# Patient Record
Sex: MALE | Race: BLACK OR AFRICAN AMERICAN | NOT HISPANIC OR LATINO | Employment: FULL TIME | URBAN - METROPOLITAN AREA
[De-identification: names, ages, dates, MRNs, and addresses within clinical notes are randomized per-mention and may not be internally consistent; named-entity substitution may affect disease eponyms.]

---

## 2021-01-05 ENCOUNTER — HISTORICAL (OUTPATIENT)
Dept: INTERNAL MEDICINE | Facility: CLINIC | Age: 46
End: 2021-01-05

## 2021-01-05 LAB
ALBUMIN SERPL-MCNC: 3.8 GM/DL (ref 3.5–5)
ALBUMIN/GLOB SERPL: 0.8 RATIO (ref 1.1–2)
ALP SERPL-CCNC: 84 UNIT/L (ref 40–150)
ALT SERPL-CCNC: 25 UNIT/L (ref 0–55)
AST SERPL-CCNC: 22 UNIT/L (ref 5–34)
BILIRUB SERPL-MCNC: 0.6 MG/DL
BILIRUBIN DIRECT+TOT PNL SERPL-MCNC: 0.3 MG/DL (ref 0–0.5)
BILIRUBIN DIRECT+TOT PNL SERPL-MCNC: 0.3 MG/DL (ref 0–0.8)
BUN SERPL-MCNC: 13 MG/DL (ref 8.9–20.6)
CALCIUM SERPL-MCNC: 9 MG/DL (ref 8.4–10.2)
CHLORIDE SERPL-SCNC: 100 MMOL/L (ref 98–107)
CHOLEST SERPL-MCNC: 186 MG/DL
CHOLEST/HDLC SERPL: 4 {RATIO} (ref 0–5)
CO2 SERPL-SCNC: 30 MMOL/L (ref 22–29)
CREAT SERPL-MCNC: 0.81 MG/DL (ref 0.73–1.18)
CREAT UR-MCNC: 126 MG/DL (ref 58–161)
EST. AVERAGE GLUCOSE BLD GHB EST-MCNC: 185.8 MG/DL
GLOBULIN SER-MCNC: 4.5 GM/DL (ref 2.4–3.5)
GLUCOSE SERPL-MCNC: 92 MG/DL (ref 74–100)
HAV IGM SERPL QL IA: NONREACTIVE
HBA1C MFR BLD: 8.1 %
HBV CORE IGM SERPL QL IA: NONREACTIVE
HBV SURFACE AG SERPL QL IA: NONREACTIVE
HCV AB SERPL QL IA: NONREACTIVE
HDLC SERPL-MCNC: 49 MG/DL (ref 35–60)
HIV 1+2 AB+HIV1 P24 AG SERPL QL IA: NONREACTIVE
LDLC SERPL CALC-MCNC: 122 MG/DL (ref 50–140)
MICROALBUMIN UR-MCNC: 6.7 UG/ML
MICROALBUMIN/CREAT RATIO PNL UR: 5.3 MG/GM CR (ref 0–30)
POTASSIUM SERPL-SCNC: 4.1 MMOL/L (ref 3.5–5.1)
PROT SERPL-MCNC: 8.3 GM/DL (ref 6.4–8.3)
SODIUM SERPL-SCNC: 138 MMOL/L (ref 136–145)
T PALLIDUM AB SER QL: NONREACTIVE
TRIGL SERPL-MCNC: 73 MG/DL (ref 34–140)
TSH SERPL-ACNC: 1.22 UIU/ML (ref 0.35–4.94)
VLDLC SERPL CALC-MCNC: 15 MG/DL

## 2021-01-18 ENCOUNTER — HISTORICAL (OUTPATIENT)
Dept: RADIOLOGY | Facility: HOSPITAL | Age: 46
End: 2021-01-18

## 2021-08-10 ENCOUNTER — HISTORICAL (OUTPATIENT)
Dept: INTERNAL MEDICINE | Facility: CLINIC | Age: 46
End: 2021-08-10

## 2021-08-10 LAB
ALBUMIN SERPL-MCNC: 3.5 GM/DL (ref 3.5–5)
ALBUMIN/GLOB SERPL: 0.8 RATIO (ref 1.1–2)
ALP SERPL-CCNC: 88 UNIT/L (ref 40–150)
ALT SERPL-CCNC: 36 UNIT/L (ref 0–55)
AST SERPL-CCNC: 27 UNIT/L (ref 5–34)
BILIRUB SERPL-MCNC: 0.7 MG/DL
BILIRUBIN DIRECT+TOT PNL SERPL-MCNC: 0.3 MG/DL (ref 0–0.5)
BILIRUBIN DIRECT+TOT PNL SERPL-MCNC: 0.4 MG/DL (ref 0–0.8)
BUN SERPL-MCNC: 11.4 MG/DL (ref 8.9–20.6)
CALCIUM SERPL-MCNC: 9.5 MG/DL (ref 8.4–10.2)
CHLORIDE SERPL-SCNC: 100 MMOL/L (ref 98–107)
CHOLEST SERPL-MCNC: 182 MG/DL
CHOLEST/HDLC SERPL: 5 {RATIO} (ref 0–5)
CO2 SERPL-SCNC: 31 MMOL/L (ref 22–29)
CREAT SERPL-MCNC: 0.85 MG/DL (ref 0.73–1.18)
CREAT UR-MCNC: 169.3 MG/DL (ref 58–161)
EST. AVERAGE GLUCOSE BLD GHB EST-MCNC: 214.5 MG/DL
GLOBULIN SER-MCNC: 4.2 GM/DL (ref 2.4–3.5)
GLUCOSE SERPL-MCNC: 119 MG/DL (ref 74–100)
HBA1C MFR BLD: 9.1 %
HDLC SERPL-MCNC: 39 MG/DL (ref 35–60)
LDLC SERPL CALC-MCNC: 121 MG/DL (ref 50–140)
MICROALBUMIN UR-MCNC: 12.5 MG/L
MICROALBUMIN/CREAT RATIO PNL UR: 7.4 MG/GM CR (ref 0–30)
POTASSIUM SERPL-SCNC: 4.2 MMOL/L (ref 3.5–5.1)
PROT SERPL-MCNC: 7.7 GM/DL (ref 6.4–8.3)
SODIUM SERPL-SCNC: 137 MMOL/L (ref 136–145)
TRIGL SERPL-MCNC: 112 MG/DL (ref 34–140)
VLDLC SERPL CALC-MCNC: 22 MG/DL

## 2021-11-23 ENCOUNTER — HISTORICAL (OUTPATIENT)
Dept: INTERNAL MEDICINE | Facility: CLINIC | Age: 46
End: 2021-11-23

## 2021-11-23 LAB
ALBUMIN SERPL-MCNC: 3.6 GM/DL (ref 3.5–5)
ALBUMIN/GLOB SERPL: 0.9 RATIO (ref 1.1–2)
ALP SERPL-CCNC: 92 UNIT/L (ref 40–150)
ALT SERPL-CCNC: 35 UNIT/L (ref 0–55)
APPEARANCE, UA: CLEAR
AST SERPL-CCNC: 29 UNIT/L (ref 5–34)
BACTERIA #/AREA URNS AUTO: ABNORMAL /HPF
BILIRUB SERPL-MCNC: 0.6 MG/DL
BILIRUB UR QL STRIP: NEGATIVE
BILIRUBIN DIRECT+TOT PNL SERPL-MCNC: 0.3 MG/DL (ref 0–0.5)
BILIRUBIN DIRECT+TOT PNL SERPL-MCNC: 0.3 MG/DL (ref 0–0.8)
BUN SERPL-MCNC: 10 MG/DL (ref 8.9–20.6)
CALCIUM SERPL-MCNC: 9.1 MG/DL (ref 8.7–10.5)
CHLORIDE SERPL-SCNC: 101 MMOL/L (ref 98–107)
CHOLEST SERPL-MCNC: 136 MG/DL
CHOLEST/HDLC SERPL: 4 {RATIO} (ref 0–5)
CO2 SERPL-SCNC: 27 MMOL/L (ref 22–29)
COLOR UR: YELLOW
CREAT SERPL-MCNC: 0.81 MG/DL (ref 0.73–1.18)
CREAT UR-MCNC: 144.4 MG/DL (ref 58–161)
EST. AVERAGE GLUCOSE BLD GHB EST-MCNC: 226 MG/DL
FSH SERPL-ACNC: 6.62 MIU/ML (ref 0.7–10.8)
GLOBULIN SER-MCNC: 4.2 GM/DL (ref 2.4–3.5)
GLUCOSE (UA): NEGATIVE
GLUCOSE SERPL-MCNC: 138 MG/DL (ref 74–100)
HBA1C MFR BLD: 9.5 %
HDLC SERPL-MCNC: 35 MG/DL (ref 35–60)
HGB UR QL STRIP: NEGATIVE
HYALINE CASTS #/AREA URNS LPF: ABNORMAL /LPF
KETONES UR QL STRIP: NEGATIVE
LDLC SERPL CALC-MCNC: 84 MG/DL (ref 50–140)
LEUKOCYTE ESTERASE UR QL STRIP: NEGATIVE
LH SERPL-ACNC: 5.09 MIU/ML
MICROALBUMIN UR-MCNC: 11.7 MG/L
MICROALBUMIN/CREAT RATIO PNL UR: 8.1 MG/GM CR (ref 0–30)
NITRITE UR QL STRIP: NEGATIVE
PH UR STRIP: 6 [PH] (ref 4.5–8)
POTASSIUM SERPL-SCNC: 4 MMOL/L (ref 3.5–5.1)
PROLACTIN LEVEL (OHS): 7.44 NG/ML (ref 3.46–19.4)
PROT SERPL-MCNC: 7.8 GM/DL (ref 6.4–8.3)
PROT UR QL STRIP: 10 MG/DL
PSA SERPL-MCNC: 0.24 NG/ML
RBC #/AREA URNS AUTO: ABNORMAL /HPF
SODIUM SERPL-SCNC: 136 MMOL/L (ref 136–145)
SP GR UR STRIP: 1.02 (ref 1–1.03)
SQUAMOUS #/AREA URNS LPF: ABNORMAL /LPF
TRIGL SERPL-MCNC: 87 MG/DL (ref 34–140)
TSH SERPL-ACNC: 1.72 UIU/ML (ref 0.35–4.94)
UROBILINOGEN UR STRIP-ACNC: NORMAL
VLDLC SERPL CALC-MCNC: 17 MG/DL
WBC #/AREA URNS AUTO: ABNORMAL /HPF

## 2022-04-10 ENCOUNTER — HISTORICAL (OUTPATIENT)
Dept: ADMINISTRATIVE | Facility: HOSPITAL | Age: 47
End: 2022-04-10
Payer: MEDICAID

## 2022-04-25 VITALS
SYSTOLIC BLOOD PRESSURE: 122 MMHG | WEIGHT: 272.38 LBS | DIASTOLIC BLOOD PRESSURE: 83 MMHG | BODY MASS INDEX: 40.34 KG/M2 | HEIGHT: 69 IN

## 2022-05-05 DIAGNOSIS — E24.0 CUSHING'S DISEASE: Primary | ICD-10-CM

## 2022-05-05 NOTE — TELEPHONE ENCOUNTER
Please f/u the results of pt's testing that is pending several weeks (salivary cortisols) from his last visit.

## 2022-05-06 RX ORDER — SILDENAFIL 50 MG/1
50 TABLET, FILM COATED ORAL DAILY PRN
Qty: 5 TABLET | Refills: 0 | Status: SHIPPED | OUTPATIENT
Start: 2022-05-06 | End: 2022-05-09 | Stop reason: SDUPTHER

## 2022-05-06 NOTE — TELEPHONE ENCOUNTER
Spoke with patient will  kit to have salivary cortisol testing done.  Will re-send the order and requesting refill on Viagra.

## 2022-05-09 DIAGNOSIS — R79.89 LOW TESTOSTERONE: Primary | ICD-10-CM

## 2022-05-09 DIAGNOSIS — E24.0 CUSHING'S DISEASE: ICD-10-CM

## 2022-05-09 RX ORDER — SILDENAFIL 50 MG/1
50 TABLET, FILM COATED ORAL DAILY PRN
Qty: 5 TABLET | Refills: 0 | Status: SHIPPED | OUTPATIENT
Start: 2022-05-09 | End: 2022-08-16 | Stop reason: SDUPTHER

## 2022-05-09 NOTE — TELEPHONE ENCOUNTER
Patient didn't have an address on file, it needed an address in order to be e-prescribe; that's why it suggested to print de rx. Proposing refill again.

## 2022-06-06 ENCOUNTER — OFFICE VISIT (OUTPATIENT)
Dept: INTERNAL MEDICINE | Facility: CLINIC | Age: 47
End: 2022-06-06
Payer: MEDICAID

## 2022-06-06 VITALS
RESPIRATION RATE: 18 BRPM | SYSTOLIC BLOOD PRESSURE: 112 MMHG | BODY MASS INDEX: 44.14 KG/M2 | HEIGHT: 69 IN | WEIGHT: 298 LBS | DIASTOLIC BLOOD PRESSURE: 71 MMHG | TEMPERATURE: 99 F | HEART RATE: 72 BPM | OXYGEN SATURATION: 95 %

## 2022-06-06 DIAGNOSIS — E11.9 TYPE 2 DIABETES MELLITUS WITHOUT COMPLICATION, WITHOUT LONG-TERM CURRENT USE OF INSULIN: Primary | ICD-10-CM

## 2022-06-06 DIAGNOSIS — E66.01 MORBID OBESITY: ICD-10-CM

## 2022-06-06 DIAGNOSIS — I10 HYPERTENSION, UNSPECIFIED TYPE: ICD-10-CM

## 2022-06-06 PROBLEM — D17.79 MYELOLIPOMA OF ADRENAL GLAND: Status: ACTIVE | Noted: 2022-06-06

## 2022-06-06 PROBLEM — K62.5 PAINLESS RECTAL BLEEDING: Status: ACTIVE | Noted: 2022-06-06

## 2022-06-06 PROBLEM — E04.9 GOITER: Status: ACTIVE | Noted: 2022-06-06

## 2022-06-06 PROBLEM — R06.83 SNORING: Status: ACTIVE | Noted: 2022-06-06

## 2022-06-06 PROBLEM — E78.5 HYPERLIPIDEMIA: Status: ACTIVE | Noted: 2022-06-06

## 2022-06-06 PROBLEM — K76.0 STEATOSIS OF LIVER: Status: ACTIVE | Noted: 2022-06-06

## 2022-06-06 PROBLEM — N20.0 CALCULUS OF KIDNEY: Status: ACTIVE | Noted: 2022-06-06

## 2022-06-06 PROCEDURE — 4010F ACE/ARB THERAPY RXD/TAKEN: CPT | Mod: CPTII,,, | Performed by: NURSE PRACTITIONER

## 2022-06-06 PROCEDURE — 3078F PR MOST RECENT DIASTOLIC BLOOD PRESSURE < 80 MM HG: ICD-10-PCS | Mod: CPTII,,, | Performed by: NURSE PRACTITIONER

## 2022-06-06 PROCEDURE — 99214 PR OFFICE/OUTPT VISIT, EST, LEVL IV, 30-39 MIN: ICD-10-PCS | Mod: S$PBB,,, | Performed by: NURSE PRACTITIONER

## 2022-06-06 PROCEDURE — 1160F PR REVIEW ALL MEDS BY PRESCRIBER/CLIN PHARMACIST DOCUMENTED: ICD-10-PCS | Mod: CPTII,,, | Performed by: NURSE PRACTITIONER

## 2022-06-06 PROCEDURE — 3078F DIAST BP <80 MM HG: CPT | Mod: CPTII,,, | Performed by: NURSE PRACTITIONER

## 2022-06-06 PROCEDURE — 1160F RVW MEDS BY RX/DR IN RCRD: CPT | Mod: CPTII,,, | Performed by: NURSE PRACTITIONER

## 2022-06-06 PROCEDURE — 3008F BODY MASS INDEX DOCD: CPT | Mod: CPTII,,, | Performed by: NURSE PRACTITIONER

## 2022-06-06 PROCEDURE — 3074F PR MOST RECENT SYSTOLIC BLOOD PRESSURE < 130 MM HG: ICD-10-PCS | Mod: CPTII,,, | Performed by: NURSE PRACTITIONER

## 2022-06-06 PROCEDURE — 3074F SYST BP LT 130 MM HG: CPT | Mod: CPTII,,, | Performed by: NURSE PRACTITIONER

## 2022-06-06 PROCEDURE — 3008F PR BODY MASS INDEX (BMI) DOCUMENTED: ICD-10-PCS | Mod: CPTII,,, | Performed by: NURSE PRACTITIONER

## 2022-06-06 PROCEDURE — 99214 OFFICE O/P EST MOD 30 MIN: CPT | Mod: S$PBB,,, | Performed by: NURSE PRACTITIONER

## 2022-06-06 PROCEDURE — 99214 OFFICE O/P EST MOD 30 MIN: CPT | Mod: PBBFAC | Performed by: NURSE PRACTITIONER

## 2022-06-06 PROCEDURE — 4010F PR ACE/ARB THEARPY RXD/TAKEN: ICD-10-PCS | Mod: CPTII,,, | Performed by: NURSE PRACTITIONER

## 2022-06-06 PROCEDURE — 1159F PR MEDICATION LIST DOCUMENTED IN MEDICAL RECORD: ICD-10-PCS | Mod: CPTII,,, | Performed by: NURSE PRACTITIONER

## 2022-06-06 PROCEDURE — 1159F MED LIST DOCD IN RCRD: CPT | Mod: CPTII,,, | Performed by: NURSE PRACTITIONER

## 2022-06-06 RX ORDER — LISINOPRIL 5 MG/1
5 TABLET ORAL
COMMUNITY
Start: 2022-04-18 | End: 2022-07-18 | Stop reason: SDUPTHER

## 2022-06-06 RX ORDER — ATORVASTATIN CALCIUM 20 MG/1
20 TABLET, FILM COATED ORAL DAILY
COMMUNITY
Start: 2022-05-06 | End: 2022-07-18 | Stop reason: SDUPTHER

## 2022-06-06 RX ORDER — METFORMIN HYDROCHLORIDE 1000 MG/1
1000 TABLET ORAL
COMMUNITY
Start: 2021-11-24 | End: 2022-07-18 | Stop reason: SDUPTHER

## 2022-06-06 RX ORDER — ASPIRIN 81 MG/1
81 TABLET ORAL
COMMUNITY
Start: 2021-11-24 | End: 2022-11-22 | Stop reason: SDUPTHER

## 2022-06-06 NOTE — PROGRESS NOTES
Libia L Dixon, NP   [facility]  [address] [place] [address]      PATIENT NAME: Neil Finnegan  : 1975  DATE: 22  MRN: [id number]      Billing Provider: Libia Metcalf NP  Level of Service:   Patient PCP Information     Provider PCP Type    Libia Metcalf NP General          Reason for Visit / Chief Complaint: Follow-up       History of Present Illness / Problem Focused Workflow     Neil Finnegan presents to the clinic with Follow-up     45 yo AAM for 6 week f/u. /71. No CBG log today. Admits he is not taking like he should. Not following ADA diet. Compliant with medications. Not exercising. Denies cough, CP, SOB, edema. No concerns/ complaints stated.      Review of Systems     Review of Systems   Constitutional: Negative for appetite change, chills, fatigue, fever and unexpected weight change.   HENT: Negative for congestion, ear pain, hearing loss, sinus pressure, sore throat and tinnitus.    Respiratory: Negative for cough, chest tightness, shortness of breath and wheezing.    Cardiovascular: Negative for chest pain, palpitations and leg swelling.   Gastrointestinal: Negative for abdominal distention, abdominal pain, blood in stool, constipation, diarrhea, nausea and vomiting.   Genitourinary: Negative for dysuria and hematuria.   Musculoskeletal: Negative for arthralgias and myalgias.   Skin: Negative for pallor.   Allergic/Immunologic: Negative for environmental allergies.   Neurological: Negative for dizziness, syncope, weakness, numbness and headaches.   Hematological: Negative for adenopathy. Does not bruise/bleed easily.   Psychiatric/Behavioral: Negative for agitation, behavioral problems, confusion, hallucinations, sleep disturbance and suicidal ideas. The patient is not nervous/anxious.        Medical / Social / Family History   History reviewed. No pertinent past medical history.    Past  Surgical History:   Procedure Laterality Date    Bone Spur removed         Social History    reports that he has never smoked. He has never used smokeless tobacco. He reports previous alcohol use. He reports previous drug use.    Family History  's family history includes Arthritis in his mother; Diabetes in his paternal aunt; Hyperlipidemia in his father.    Medications and Allergies     Medications  Medication List with Changes/Refills   Current Medications    ASPIRIN (ECOTRIN) 81 MG EC TABLET    Take 81 mg by mouth.    ATORVASTATIN (LIPITOR) 20 MG TABLET    Take 20 mg by mouth once daily.    LISINOPRIL (PRINIVIL,ZESTRIL) 5 MG TABLET    Take 5 mg by mouth.    METFORMIN (GLUCOPHAGE) 1000 MG TABLET    Take 1,000 mg by mouth.    SILDENAFIL (VIAGRA) 50 MG TABLET    Take 1 tablet (50 mg total) by mouth daily as needed for Erectile Dysfunction.    SITAGLIPTIN (JANUVIA) 100 MG TAB    Take 100 mg by mouth.       Allergies  Review of patient's allergies indicates:  No Known Allergies    Physical Examination     Vitals:    06/06/22 0715   BP: 112/71   Pulse: 72   Resp: 18   Temp: 98.5 °F (36.9 °C)     Physical Exam  Vitals and nursing note reviewed.   Constitutional:       Appearance: Normal appearance. He is not ill-appearing.   HENT:      Head: Normocephalic.   Cardiovascular:      Rate and Rhythm: Normal rate and regular rhythm.      Pulses: Normal pulses.   Pulmonary:      Effort: Pulmonary effort is normal. No respiratory distress.      Breath sounds: Normal breath sounds.   Musculoskeletal:         General: Normal range of motion.      Cervical back: Normal range of motion and neck supple.      Right lower leg: No edema.      Left lower leg: No edema.   Skin:     General: Skin is warm and dry.      Capillary Refill: Capillary refill takes less than 2 seconds.   Neurological:      Mental Status: He is alert and oriented to person, place, and time. Mental status is at baseline.      Motor: No  weakness.   Psychiatric:         Mood and Affect: Mood normal.         Behavior: Behavior normal.         Thought Content: Thought content normal.         Judgment: Judgment normal.           Results     Lab Results   Component Value Date    WBC 9.6 07/26/2021    RBC 5.06 07/26/2021    HGB 14.0 07/26/2021    HCT 43.7 07/26/2021    MCV 86.4 07/26/2021    MCH 27.7 07/26/2021    MCHC 32.0 07/26/2021    RDW 13.1 07/26/2021     07/26/2021    MPV 11.3 (H) 07/26/2021     CMP  Sodium Level   Date Value Ref Range Status   11/23/2021 136 136 - 145 mmol/L Final     Potassium Level   Date Value Ref Range Status   11/23/2021 4.0 3.5 - 5.1 mmol/L Final     Carbon Dioxide   Date Value Ref Range Status   11/23/2021 27 22 - 29 mmol/L Final     Blood Urea Nitrogen   Date Value Ref Range Status   11/23/2021 10.0 8.9 - 20.6 mg/dL Final     Creatinine   Date Value Ref Range Status   11/23/2021 0.81 0.73 - 1.18 mg/dL Final     Calcium Level Total   Date Value Ref Range Status   11/23/2021 9.1 8.7 - 10.5 mg/dL Final     Albumin Level   Date Value Ref Range Status   11/23/2021 3.6 3.5 - 5.0 gm/dL Final     Bilirubin Total   Date Value Ref Range Status   11/23/2021 0.6 <<=1.5 mg/dL Final     Alkaline Phosphatase   Date Value Ref Range Status   11/23/2021 92 40 - 150 unit/L Final     Aspartate Aminotransferase   Date Value Ref Range Status   11/23/2021 29 5 - 34 unit/L Final     Alanine Aminotransferase   Date Value Ref Range Status   11/23/2021 35 0 - 55 unit/L Final     Estimated GFR-Non    Date Value Ref Range Status   11/23/2021 >105 >>=90 mL/min/1.73 m2 Final     Lab Results   Component Value Date    CHOL 136 11/23/2021     Lab Results   Component Value Date    HDL 35 11/23/2021     No results found for: LDLCALC  Lab Results   Component Value Date    TRIG 87 11/23/2021     No results found for: CHOLHDL  Lab Results   Component Value Date    TSH 1.7224 11/23/2021     Lab Results   Component Value Date    PHUR 6.0  11/23/2021    PROTEINUA 10 (A) 11/23/2021    GLUCUA Negative 11/23/2021    KETONESU Negative 11/23/2021    OCCULTUA Negative 11/23/2021    NITRITE Negative 11/23/2021    LEUKOCYTESUR Negative 11/23/2021           Assessment and Plan (including Health Maintenance)     Plan:         Health Maintenance Due   Topic Date Due    TETANUS VACCINE  Never done    Colorectal Cancer Screening  Never done    COVID-19 Vaccine (3 - Booster for Moderna series) 09/29/2021       Problem List Items Addressed This Visit        Cardiac/Vascular    Hypertension    Overview     Follow low sodium diet, < 2 gm/day (avoid high salty foods such as processed meats/ sausage/morrell/ sandwich meat, chips, pickles, cheese, crackers and soft drinks/ electrolyte replacement drinks).  Avoid tobacco/ alcohol use  Educated on health benefits of at least 5 days/ week of 30 minutes moderate intensity exercise (brisk walking) and 2 or more days/ week of muscle strength activities  Daily ASA 81 mg for CV prevention  Continue current medication regimen             Current Assessment & Plan     BP improved, 112/71           Relevant Orders    Comprehensive Metabolic Panel    Hemoglobin A1C    Microalbumin/Creatinine Ratio, Urine    CBC Auto Differential       Endocrine    Morbid obesity    Overview     BMI 44.01  Educated on increased risk of disease s/t obesity.  Educated on health benefits of at least 5 days/ week of 30 minutes moderate intensity exercise (brisk walking) and 2 or more days/ week of muscle strength activities (as tolerated).  Eat well balanced diet of fresh fruits/ vegetables, whole grains, lean meats and limit high carbohydrate foods.              Type 2 diabetes mellitus - Primary    Overview     Educated on ADA diet: eliminate/decrease high carbohydrate foods (rice, pasta, bread, potatoes (french fries, chips), candy, sweets, fruit juices, canned fruit, junk food, crackers, carbonated beverages)  Educated on health benefits of at least  5 days/ week of 30 minutes moderate intensity exercise (brisk walking) and 2 or more days/ week of muscle strength activities  Avoid alcohol or tobacco use  Eye exam: 12/31/21 [facility] mild NPDR OS  Foot exam: 4/18/22  Per recommendations, continue with ACEI/ARB, Daily ASA 81 mg for CV prevention, Statin therapy  Continue with current regimen             Current Assessment & Plan     A1c 10.5% April 12, 2022  CBGs: infrequent checks  ADA diet: non compliant  Hypoglycemia: denies  Continue medications, f/u in 6 weeks with labs  Encouraged CBG checks           Relevant Medications    metFORMIN (GLUCOPHAGE) 1000 MG tablet    SITagliptin (JANUVIA) 100 MG Tab    Other Relevant Orders    Comprehensive Metabolic Panel    Hemoglobin A1C    Microalbumin/Creatinine Ratio, Urine    CBC Auto Differential          Health Maintenance Topics with due status: Not Due       Topic Last Completion Date    Diabetes Urine Screening 11/23/2021    Lipid Panel 11/23/2021    Hemoglobin A1c 11/23/2021    Eye Exam 12/13/2021    Foot Exam 04/18/2022    Low Dose Statin 06/06/2022    Influenza Vaccine Not Due       Future Appointments   Date Time Provider Department Center   7/18/2022  8:30 AM Libia Metcalf NP [facility]            Signature:  Libia Metcalf NP  [facility]  [address] [facility] [address]    Date of encounter: 6/6/22

## 2022-06-06 NOTE — ASSESSMENT & PLAN NOTE
A1c 10.5% April 12, 2022  CBGs: infrequent checks  ADA diet: non compliant  Hypoglycemia: denies  Continue medications, f/u in 6 weeks with labs  Encouraged CBG checks

## 2022-06-09 ENCOUNTER — LAB VISIT (OUTPATIENT)
Dept: LAB | Facility: HOSPITAL | Age: 47
End: 2022-06-09
Attending: INTERNAL MEDICINE
Payer: MEDICAID

## 2022-06-09 DIAGNOSIS — Z12.11 SCREENING FOR COLON CANCER: Primary | ICD-10-CM

## 2022-06-09 DIAGNOSIS — I10 HYPERTENSION, UNSPECIFIED TYPE: ICD-10-CM

## 2022-06-09 DIAGNOSIS — E78.5 HYPERLIPIDEMIA, UNSPECIFIED HYPERLIPIDEMIA TYPE: ICD-10-CM

## 2022-06-09 PROCEDURE — 82533 TOTAL CORTISOL: CPT

## 2022-06-09 PROCEDURE — 30000890 MAYO GENERIC ORDERABLE

## 2022-06-13 LAB — MAYO GENERIC ORDERABLE RESULT: NORMAL

## 2022-06-15 LAB — MAYO GENERIC ORDERABLE RESULT: NORMAL

## 2022-06-17 DIAGNOSIS — R79.89 LOW TESTOSTERONE: ICD-10-CM

## 2022-06-17 NOTE — TELEPHONE ENCOUNTER
Labs are finally all back.  Free testosterone remains wnl suggesting the low total is a lab issue from [facility] and not representing true hypogonadism.  Salivary cortisols were wnl r/o cushings.  As per last visit, continue ED med for symptoms, Endo f/u prn.

## 2022-06-17 NOTE — TELEPHONE ENCOUNTER
Spoke with patient informed labs are back and free testosterone remains with in range suggesting the low total was a lab issue and not true hypogonadism and the salivary cortisol was normal as well ruling out cushing's.  As per last visit continue ED medications for symptoms and will follow up in ENDO as needed.

## 2022-06-20 RX ORDER — SILDENAFIL 50 MG/1
50 TABLET, FILM COATED ORAL DAILY PRN
Qty: 5 TABLET | Refills: 0 | Status: SHIPPED | OUTPATIENT
Start: 2022-06-20 | End: 2023-06-20

## 2022-07-15 ENCOUNTER — LAB VISIT (OUTPATIENT)
Dept: LAB | Facility: HOSPITAL | Age: 47
End: 2022-07-15
Attending: NURSE PRACTITIONER
Payer: MEDICAID

## 2022-07-15 DIAGNOSIS — I10 HYPERTENSION, UNSPECIFIED TYPE: ICD-10-CM

## 2022-07-15 DIAGNOSIS — E11.9 TYPE 2 DIABETES MELLITUS WITHOUT COMPLICATION, WITHOUT LONG-TERM CURRENT USE OF INSULIN: ICD-10-CM

## 2022-07-15 LAB
ALBUMIN SERPL-MCNC: 3.5 GM/DL (ref 3.5–5)
ALBUMIN/GLOB SERPL: 0.8 RATIO (ref 1.1–2)
ALP SERPL-CCNC: 98 UNIT/L (ref 40–150)
ALT SERPL-CCNC: 32 UNIT/L (ref 0–55)
AST SERPL-CCNC: 25 UNIT/L (ref 5–34)
BASOPHILS # BLD AUTO: 0.01 X10(3)/MCL (ref 0–0.2)
BASOPHILS NFR BLD AUTO: 0.2 %
BILIRUBIN DIRECT+TOT PNL SERPL-MCNC: 0.4 MG/DL
BUN SERPL-MCNC: 7.7 MG/DL (ref 8.9–20.6)
CALCIUM SERPL-MCNC: 9.6 MG/DL (ref 8.4–10.2)
CHLORIDE SERPL-SCNC: 101 MMOL/L (ref 98–107)
CO2 SERPL-SCNC: 28 MMOL/L (ref 22–29)
CREAT SERPL-MCNC: 0.85 MG/DL (ref 0.73–1.18)
EOSINOPHIL # BLD AUTO: 0.12 X10(3)/MCL (ref 0–0.9)
EOSINOPHIL NFR BLD AUTO: 1.8 %
ERYTHROCYTE [DISTWIDTH] IN BLOOD BY AUTOMATED COUNT: 12.5 % (ref 11.5–17)
EST. AVERAGE GLUCOSE BLD GHB EST-MCNC: 263.3 MG/DL
GLOBULIN SER-MCNC: 4.2 GM/DL (ref 2.4–3.5)
GLUCOSE SERPL-MCNC: 170 MG/DL (ref 74–100)
HBA1C MFR BLD: 10.8 %
HCT VFR BLD AUTO: 44 % (ref 42–52)
HGB BLD-MCNC: 13.8 GM/DL (ref 14–18)
IMM GRANULOCYTES # BLD AUTO: 0.01 X10(3)/MCL (ref 0–0.04)
IMM GRANULOCYTES NFR BLD AUTO: 0.2 %
LYMPHOCYTES # BLD AUTO: 3.24 X10(3)/MCL (ref 0.6–4.6)
LYMPHOCYTES NFR BLD AUTO: 49.2 %
MCH RBC QN AUTO: 26.7 PG (ref 27–31)
MCHC RBC AUTO-ENTMCNC: 31.4 MG/DL (ref 33–36)
MCV RBC AUTO: 85.3 FL (ref 80–94)
MONOCYTES # BLD AUTO: 0.42 X10(3)/MCL (ref 0.1–1.3)
MONOCYTES NFR BLD AUTO: 6.4 %
NEUTROPHILS # BLD AUTO: 2.8 X10(3)/MCL (ref 2.1–9.2)
NEUTROPHILS NFR BLD AUTO: 42.2 %
NRBC BLD AUTO-RTO: 0 %
PLATELET # BLD AUTO: 217 X10(3)/MCL (ref 130–400)
PMV BLD AUTO: 11.1 FL (ref 7.4–10.4)
POTASSIUM SERPL-SCNC: 4.2 MMOL/L (ref 3.5–5.1)
PROT SERPL-MCNC: 7.7 GM/DL (ref 6.4–8.3)
RBC # BLD AUTO: 5.16 X10(6)/MCL (ref 4.7–6.1)
SODIUM SERPL-SCNC: 139 MMOL/L (ref 136–145)
WBC # SPEC AUTO: 6.6 X10(3)/MCL (ref 4.5–11.5)

## 2022-07-15 PROCEDURE — 83036 HEMOGLOBIN GLYCOSYLATED A1C: CPT

## 2022-07-15 PROCEDURE — 80053 COMPREHEN METABOLIC PANEL: CPT

## 2022-07-15 PROCEDURE — 36415 COLL VENOUS BLD VENIPUNCTURE: CPT

## 2022-07-15 PROCEDURE — 85025 COMPLETE CBC W/AUTO DIFF WBC: CPT

## 2022-07-18 ENCOUNTER — OFFICE VISIT (OUTPATIENT)
Dept: INTERNAL MEDICINE | Facility: CLINIC | Age: 47
End: 2022-07-18
Payer: MEDICAID

## 2022-07-18 VITALS
HEART RATE: 64 BPM | WEIGHT: 294.13 LBS | RESPIRATION RATE: 18 BRPM | DIASTOLIC BLOOD PRESSURE: 73 MMHG | TEMPERATURE: 98 F | BODY MASS INDEX: 43.57 KG/M2 | SYSTOLIC BLOOD PRESSURE: 109 MMHG | HEIGHT: 69 IN

## 2022-07-18 DIAGNOSIS — E78.5 HYPERLIPIDEMIA, UNSPECIFIED HYPERLIPIDEMIA TYPE: Primary | ICD-10-CM

## 2022-07-18 DIAGNOSIS — E11.9 TYPE 2 DIABETES MELLITUS WITHOUT COMPLICATION, WITHOUT LONG-TERM CURRENT USE OF INSULIN: ICD-10-CM

## 2022-07-18 DIAGNOSIS — I10 HYPERTENSION, UNSPECIFIED TYPE: ICD-10-CM

## 2022-07-18 DIAGNOSIS — E66.01 MORBID OBESITY: ICD-10-CM

## 2022-07-18 PROCEDURE — 4010F ACE/ARB THERAPY RXD/TAKEN: CPT | Mod: CPTII,,, | Performed by: NURSE PRACTITIONER

## 2022-07-18 PROCEDURE — 1159F MED LIST DOCD IN RCRD: CPT | Mod: CPTII,,, | Performed by: NURSE PRACTITIONER

## 2022-07-18 PROCEDURE — 3078F DIAST BP <80 MM HG: CPT | Mod: CPTII,,, | Performed by: NURSE PRACTITIONER

## 2022-07-18 PROCEDURE — 3074F PR MOST RECENT SYSTOLIC BLOOD PRESSURE < 130 MM HG: ICD-10-PCS | Mod: CPTII,,, | Performed by: NURSE PRACTITIONER

## 2022-07-18 PROCEDURE — 99214 OFFICE O/P EST MOD 30 MIN: CPT | Mod: S$PBB,,, | Performed by: NURSE PRACTITIONER

## 2022-07-18 PROCEDURE — 3078F PR MOST RECENT DIASTOLIC BLOOD PRESSURE < 80 MM HG: ICD-10-PCS | Mod: CPTII,,, | Performed by: NURSE PRACTITIONER

## 2022-07-18 PROCEDURE — 3008F BODY MASS INDEX DOCD: CPT | Mod: CPTII,,, | Performed by: NURSE PRACTITIONER

## 2022-07-18 PROCEDURE — 3008F PR BODY MASS INDEX (BMI) DOCUMENTED: ICD-10-PCS | Mod: CPTII,,, | Performed by: NURSE PRACTITIONER

## 2022-07-18 PROCEDURE — 99214 OFFICE O/P EST MOD 30 MIN: CPT | Mod: PBBFAC | Performed by: NURSE PRACTITIONER

## 2022-07-18 PROCEDURE — 1159F PR MEDICATION LIST DOCUMENTED IN MEDICAL RECORD: ICD-10-PCS | Mod: CPTII,,, | Performed by: NURSE PRACTITIONER

## 2022-07-18 PROCEDURE — 3074F SYST BP LT 130 MM HG: CPT | Mod: CPTII,,, | Performed by: NURSE PRACTITIONER

## 2022-07-18 PROCEDURE — 99214 PR OFFICE/OUTPT VISIT, EST, LEVL IV, 30-39 MIN: ICD-10-PCS | Mod: S$PBB,,, | Performed by: NURSE PRACTITIONER

## 2022-07-18 PROCEDURE — 4010F PR ACE/ARB THEARPY RXD/TAKEN: ICD-10-PCS | Mod: CPTII,,, | Performed by: NURSE PRACTITIONER

## 2022-07-18 RX ORDER — PHENYLEPHRINE HCL 10 MG
1000 TABLET ORAL DAILY
COMMUNITY

## 2022-07-18 RX ORDER — PIOGLITAZONEHYDROCHLORIDE 15 MG/1
15 TABLET ORAL DAILY
Qty: 90 TABLET | Refills: 0 | Status: SHIPPED | OUTPATIENT
Start: 2022-07-18 | End: 2022-11-22 | Stop reason: SDUPTHER

## 2022-07-18 RX ORDER — LISINOPRIL 5 MG/1
5 TABLET ORAL DAILY
Qty: 90 TABLET | Refills: 0 | Status: SHIPPED | OUTPATIENT
Start: 2022-07-18 | End: 2022-11-22 | Stop reason: SDUPTHER

## 2022-07-18 RX ORDER — ATORVASTATIN CALCIUM 20 MG/1
20 TABLET, FILM COATED ORAL DAILY
Qty: 90 TABLET | Refills: 0 | Status: SHIPPED | OUTPATIENT
Start: 2022-07-18 | End: 2022-11-22 | Stop reason: SDUPTHER

## 2022-07-18 RX ORDER — METFORMIN HYDROCHLORIDE 1000 MG/1
1000 TABLET ORAL 2 TIMES DAILY WITH MEALS
Qty: 180 TABLET | Refills: 0 | Status: SHIPPED | OUTPATIENT
Start: 2022-07-18 | End: 2022-11-22 | Stop reason: SDUPTHER

## 2022-07-18 NOTE — ASSESSMENT & PLAN NOTE
/73  Follow low sodium diet, < 2 gm/day (avoid high salty foods such as processed meats/ sausage/morrell/ sandwich meat, chips, pickles, cheese, crackers and soft drinks/ electrolyte replacement drinks).  Avoid tobacco/ alcohol use  Educated on health benefits of at least 5 days/ week of 30 minutes moderate intensity exercise (brisk walking) and 2 or more days/ week of muscle strength activities  Daily ASA 81 mg for CV prevention  Continue current medication regimen

## 2022-07-18 NOTE — ASSESSMENT & PLAN NOTE
LDL 70, Trig 166, HLD 34, Total 137 April 2022  Avoid tobacco/ alcohol  Follow low fat/low cholesterol diet such as avoid/ decrease morrell, sausage, fried foods, cookies, cakes, chips, cheese, whole milk, butter, mayonnaise. Add olive oil, avocados, lean meats, fresh fruits/ vegetables, heart healthy nuts to diet.  Educated on health benefits of exercise 5 days/ week of at least 30 minutes moderate intensity exercise (brisk walking) and 2 days/ week of muscle strength activities  Daily ASA 81 mg for CV prevention  Continue current medication regimen

## 2022-07-18 NOTE — PROGRESS NOTES
Libia L Dixon, NP   [facility]  [address] [facility] [address]      PATIENT NAME: Neil Finnegan  : 1975  DATE: 22  MRN: [id number]      Billing Provider: Libia Metcalf NP  Level of Service: [place] OFFICE/OUTPT VISIT, EST, LEVL IV, 30-39 MIN  Patient PCP Information     Provider PCP Type    Libia Metcalf NP General          Reason for Visit / Chief Complaint: Diabetes       History of Present Illness / Problem Focused Workflow     Neil Finnegan presents to the clinic with Diabetes     /73. Labs reviewed with worsened A1c 10.8%. He states he feels well. He does not understand why his numbers went up. He checks sugars in the morning and reading around 150s per pt. He states he does not check all the time. May not follow the diet all the time but does not understand why if he is taking medication as he should numbers are not improving. He is very frustrated. He states he is not a test sample. He does not need to check sugars often. He is not interested in adding medications and asking why we cannot change medications he is currently on. Length conversation with patient had regarding plan of care and recommendations with uncontrolled DM. He denies any other concerns or complaints.     Diabetes  He has type 2 diabetes mellitus. No MedicAlert identification noted. Pertinent negatives for hypoglycemia include no confusion, dizziness, headaches, hunger, mood changes, nervousness/anxiousness, pallor, seizures, sleepiness, speech difficulty, sweats or tremors. Pertinent negatives for diabetes include no blurred vision, no chest pain, no fatigue, no foot paresthesias, no foot ulcerations, no polydipsia, no polyphagia, no polyuria, no visual change, no weakness and no weight loss. Pertinent negatives for hypoglycemia complications include no blackouts, no hospitalization, no nocturnal hypoglycemia, no required assistance  and no required glucagon injection. Symptoms are stable. Diabetic complications include impotence. Pertinent negatives for diabetic complications include no autonomic neuropathy, CVA, heart disease, nephropathy, peripheral neuropathy, PVD or retinopathy. Risk factors for coronary artery disease include no known risk factors and family history. Current diabetic treatment includes oral agent (triple therapy). He is compliant with treatment most of the time. His weight is stable. He is following a diabetic diet. When asked about meal planning, he reported none. He has not had a previous visit with a dietitian. He rarely participates in exercise. He monitors blood glucose at home 1-2 x per week. He monitors urine at home <1 x per month. Blood glucose monitoring compliance is inadequate. His home blood glucose trend is decreasing steadily. He does not see a podiatrist.Eye exam is current.       Review of Systems     Review of Systems   Constitutional: Negative for appetite change, chills, fatigue, fever, unexpected weight change and weight loss.   HENT: Negative for congestion, ear pain, hearing loss, sinus pressure, sore throat and tinnitus.    Eyes: Negative for blurred vision.   Respiratory: Negative for cough, chest tightness, shortness of breath and wheezing.    Cardiovascular: Negative for chest pain, palpitations and leg swelling.   Gastrointestinal: Negative for abdominal distention, abdominal pain, blood in stool, constipation, diarrhea, nausea and vomiting.   Endocrine: Negative for polydipsia, polyphagia and polyuria.   Genitourinary: Positive for impotence. Negative for dysuria and hematuria.   Musculoskeletal: Negative for arthralgias and myalgias.   Skin: Negative for pallor.   Allergic/Immunologic: Negative for environmental allergies.   Neurological: Negative for dizziness, tremors, seizures, syncope, speech difficulty, weakness, numbness and headaches.   Hematological: Negative for adenopathy. Does not  bruise/bleed easily.   Psychiatric/Behavioral: Negative for agitation, behavioral problems, confusion, hallucinations, sleep disturbance and suicidal ideas. The patient is not nervous/anxious.        Medical / Social / Family History     Past Medical History:   Diagnosis Date    DM (diabetes mellitus)     Hyperlipidemia     Hypertension        Past Surgical History:   Procedure Laterality Date    Bone Spur removed         Social History  Mr. Trejo  reports that he has never smoked. He has never used smokeless tobacco. He reports current alcohol use. He reports previous drug use. Drug: Marijuana.    Family History  Mr. Trejo's family history includes Arthritis in his mother; Diabetes in his paternal aunt; Hyperlipidemia in his father.    Medications and Allergies     Medications  Medication List with Changes/Refills   New Medications    PIOGLITAZONE (ACTOS) 15 MG TABLET    Take 1 tablet (15 mg total) by mouth once daily.   Current Medications    ASPIRIN (ECOTRIN) 81 MG EC TABLET    Take 81 mg by mouth.    CINNAMON BARK (CINNAMON) 500 MG CAPSULE    Take 1,000 mg by mouth once daily.    SILDENAFIL (VIAGRA) 50 MG TABLET    Take 1 tablet (50 mg total) by mouth daily as needed for Erectile Dysfunction.    SILDENAFIL (VIAGRA) 50 MG TABLET    Take 1 tablet (50 mg total) by mouth daily as needed for Erectile Dysfunction.   Changed and/or Refilled Medications    Modified Medication Previous Medication    ATORVASTATIN (LIPITOR) 20 MG TABLET atorvastatin (LIPITOR) 20 MG tablet       Take 1 tablet (20 mg total) by mouth once daily.    Take 20 mg by mouth once daily.    LISINOPRIL (PRINIVIL,ZESTRIL) 5 MG TABLET lisinopriL (PRINIVIL,ZESTRIL) 5 MG tablet       Take 1 tablet (5 mg total) by mouth once daily.    Take 5 mg by mouth.    METFORMIN (GLUCOPHAGE) 1000 MG TABLET metFORMIN (GLUCOPHAGE) 1000 MG tablet       Take 1 tablet (1,000 mg total) by mouth 2 (two) times daily with meals.    Take 1,000 mg by mouth.     SITAGLIPTIN (JANUVIA) 100 MG TAB SITagliptin (JANUVIA) 100 MG Tab       Take 1 tablet (100 mg total) by mouth once daily.    Take 100 mg by mouth.       Allergies  Review of patient's allergies indicates:  No Known Allergies    Physical Examination     Vitals:    07/18/22 0908   BP: 109/73   Pulse: 64   Resp: 18   Temp: 98.2 °F (36.8 °C)     Physical Exam  Vitals and nursing note reviewed.   Constitutional:       Appearance: Normal appearance. He is not ill-appearing.   HENT:      Head: Normocephalic.   Cardiovascular:      Rate and Rhythm: Normal rate and regular rhythm.      Pulses: Normal pulses.   Pulmonary:      Effort: Pulmonary effort is normal. No respiratory distress.      Breath sounds: Normal breath sounds.   Musculoskeletal:         General: Normal range of motion.      Cervical back: Normal range of motion and neck supple.      Right lower leg: No edema.      Left lower leg: No edema.   Skin:     General: Skin is warm and dry.      Capillary Refill: Capillary refill takes less than 2 seconds.   Neurological:      Mental Status: He is alert and oriented to person, place, and time. Mental status is at baseline.      Motor: No weakness.   Psychiatric:         Mood and Affect: Mood normal.         Behavior: Behavior normal.         Thought Content: Thought content normal.         Judgment: Judgment normal.           Results     Lab Results   Component Value Date    WBC 6.6 07/15/2022    RBC 5.16 07/15/2022    HGB 13.8 (L) 07/15/2022    HCT 44.0 07/15/2022    MCV 85.3 07/15/2022    MCH 26.7 (L) 07/15/2022    MCHC 31.4 (L) 07/15/2022    RDW 12.5 07/15/2022     07/15/2022    MPV 11.1 (H) 07/15/2022     CMP  Sodium Level   Date Value Ref Range Status   07/15/2022 139 136 - 145 mmol/L Final     Potassium Level   Date Value Ref Range Status   07/15/2022 4.2 3.5 - 5.1 mmol/L Final     Carbon Dioxide   Date Value Ref Range Status   07/15/2022 28 22 - 29 mmol/L Final     Blood Urea Nitrogen   Date Value Ref  Range Status   07/15/2022 7.7 (L) 8.9 - 20.6 mg/dL Final     Creatinine   Date Value Ref Range Status   07/15/2022 0.85 0.73 - 1.18 mg/dL Final     Calcium Level Total   Date Value Ref Range Status   07/15/2022 9.6 8.4 - 10.2 mg/dL Final     Albumin Level   Date Value Ref Range Status   07/15/2022 3.5 3.5 - 5.0 gm/dL Final     Bilirubin Total   Date Value Ref Range Status   07/15/2022 0.4 <=1.5 mg/dL Final     Alkaline Phosphatase   Date Value Ref Range Status   07/15/2022 98 40 - 150 unit/L Final     Aspartate Aminotransferase   Date Value Ref Range Status   07/15/2022 25 5 - 34 unit/L Final     Alanine Aminotransferase   Date Value Ref Range Status   07/15/2022 32 0 - 55 unit/L Final     Estimated GFR-Non    Date Value Ref Range Status   11/23/2021 >105 >>=90 mL/min/1.73 m2 Final     Lab Results   Component Value Date    CHOL 136 11/23/2021     Lab Results   Component Value Date    HDL 35 11/23/2021     No results found for: LDLCALC  Lab Results   Component Value Date    TRIG 87 11/23/2021     No results found for: CHOLHDL  Lab Results   Component Value Date    TSH 1.7224 11/23/2021     Lab Results   Component Value Date    PHUR 6.0 11/23/2021    PROTEINUA 10 (A) 11/23/2021    GLUCUA Negative 11/23/2021    KETONESU Negative 11/23/2021    OCCULTUA Negative 11/23/2021    NITRITE Negative 11/23/2021    LEUKOCYTESUR Negative 11/23/2021           Assessment and Plan (including Health Maintenance)     Plan:         Health Maintenance Due   Topic Date Due    Pneumococcal Vaccines (Age 0-64) (1 - PCV) Never done    TETANUS VACCINE  Never done    Colorectal Cancer Screening  Never done    COVID-19 Vaccine (3 - Booster for Moderna series) 09/29/2021       Problem List Items Addressed This Visit        Cardiac/Vascular    Hyperlipidemia - Primary    Current Assessment & Plan     LDL 70, Trig 166, HLD 34, Total 137 April 2022  Avoid tobacco/ alcohol  Follow low fat/low cholesterol diet such as avoid/  decrease morrell, sausage, fried foods, cookies, cakes, chips, cheese, whole milk, butter, mayonnaise. Add olive oil, avocados, lean meats, fresh fruits/ vegetables, heart healthy nuts to diet.  Educated on health benefits of exercise 5 days/ week of at least 30 minutes moderate intensity exercise (brisk walking) and 2 days/ week of muscle strength activities  Daily ASA 81 mg for CV prevention  Continue current medication regimen               Hypertension    Current Assessment & Plan     /72  Follow low sodium diet, < 2 gm/day (avoid high salty foods such as processed meats/ sausage/morrell/ sandwich meat, chips, pickles, cheese, crackers and soft drinks/ electrolyte replacement drinks).  Avoid tobacco/ alcohol use  Educated on health benefits of at least 5 days/ week of 30 minutes moderate intensity exercise (brisk walking) and 2 or more days/ week of muscle strength activities  Daily ASA 81 mg for CV prevention  Continue current medication regimen                Endocrine    Morbid obesity    Current Assessment & Plan     BMI 43.41, wt 294#  Educated on increased risk of disease s/t obesity.  Educated on health benefits of at least 5 days/ week of 30 minutes moderate intensity exercise (brisk walking) and 2 or more days/ week of muscle strength activities (as tolerated).  Eat well balanced diet of fresh fruits/ vegetables, whole grains, lean meats and limit high carbohydrate foods.              Type 2 diabetes mellitus    Current Assessment & Plan     A1c 10.8% worsened  CBG checks in the morning with readings around 150s per pt  Hypoglycemia: denies  Educated on ADA diet: eliminate/decrease high carbohydrate foods (rice, pasta, bread, potatoes (french fries, chips), candy, sweets, fruit juices, canned fruit, junk food, crackers, carbonated beverages)  Educated on health benefits of at least 5 days/ week of 30 minutes moderate intensity exercise (brisk walking) and 2 or more days/ week of muscle strength  activities  Avoid alcohol or tobacco use  Eye exam: 12/31/21 [facility] [place] mild NPDR OS  Foot exam: 4/18/22  Per recommendations, continue with ACEI/ARB, Daily ASA 81 mg for CV prevention, Statin therapy  Discussed in detail with patient importance of following ADA Diet and exercise for weight loss. Patient would benefit from Diabetes Education. He states he will not attend. He is not following ADA diet.   Discussed portion controls, carb contents, plate suggestions and foods to limit/ avoid.   Add Actos 15 mg once daily in addition to Metformin 1000 mg BID and Januvia 100 mg once daily. He does not understand and expresses much frustration regarding need for additional medication. Patient would benefit from insulin at this point however he is not willing to do so.              Relevant Medications    pioglitazone (ACTOS) 15 MG tablet    metFORMIN (GLUCOPHAGE) 1000 MG tablet    SITagliptin (JANUVIA) 100 MG Tab    Other Relevant Orders    Ambulatory referral/consult to Diabetes Education          Health Maintenance Topics with due status: Not Due       Topic Last Completion Date    Diabetes Urine Screening 11/23/2021    Lipid Panel 11/23/2021    Eye Exam 12/14/2021    Foot Exam 04/18/2022    Hemoglobin A1c 07/15/2022    Low Dose Statin 07/18/2022    Influenza Vaccine Not Due       Future Appointments   Date Time Provider Department Center   8/16/2022  7:15 AM Libia Metcalf NP [facility]      Follow up in 4 weeks with CBG log.       Signature:  Libia Metcalf NP  [facility]  [address] [facility] [address]    Date of encounter: 7/18/22

## 2022-07-18 NOTE — ASSESSMENT & PLAN NOTE
BMI 43.41, wt 294#  Educated on increased risk of disease s/t obesity.  Educated on health benefits of at least 5 days/ week of 30 minutes moderate intensity exercise (brisk walking) and 2 or more days/ week of muscle strength activities (as tolerated).  Eat well balanced diet of fresh fruits/ vegetables, whole grains, lean meats and limit high carbohydrate foods.

## 2022-07-18 NOTE — ASSESSMENT & PLAN NOTE
A1c 10.8% worsened  CBG checks in the morning with readings around 150s per pt  Hypoglycemia: denies  Educated on ADA diet: eliminate/decrease high carbohydrate foods (rice, pasta, bread, potatoes (french fries, chips), candy, sweets, fruit juices, canned fruit, junk food, crackers, carbonated beverages)  Educated on health benefits of at least 5 days/ week of 30 minutes moderate intensity exercise (brisk walking) and 2 or more days/ week of muscle strength activities  Avoid alcohol or tobacco use  Eye exam: 12/31/21 [facility] [place] mild NPDR OS  Foot exam: 4/18/22  Per recommendations, continue with ACEI/ARB, Daily ASA 81 mg for CV prevention, Statin therapy  Discussed in detail with patient importance of following ADA Diet and exercise for weight loss. Patient would benefit from Diabetes Education. He states he will not attend. He is not following ADA diet.   Discussed portion controls, carb contents, plate suggestions and foods to limit/ avoid.   Add Actos 15 mg once daily in addition to Metformin 1000 mg BID and Januvia 100 mg once daily. He does not understand and expresses much frustration regarding need for additional medication. Patient would benefit from insulin at this point however he is not willing to do so.

## 2022-08-16 ENCOUNTER — OFFICE VISIT (OUTPATIENT)
Dept: INTERNAL MEDICINE | Facility: CLINIC | Age: 47
End: 2022-08-16
Payer: MEDICAID

## 2022-08-16 VITALS
BODY MASS INDEX: 44.08 KG/M2 | TEMPERATURE: 98 F | WEIGHT: 297.63 LBS | DIASTOLIC BLOOD PRESSURE: 85 MMHG | HEIGHT: 69 IN | HEART RATE: 71 BPM | SYSTOLIC BLOOD PRESSURE: 128 MMHG

## 2022-08-16 DIAGNOSIS — M10.9 GOUT, UNSPECIFIED CAUSE, UNSPECIFIED CHRONICITY, UNSPECIFIED SITE: ICD-10-CM

## 2022-08-16 DIAGNOSIS — Z12.11 COLON CANCER SCREENING: ICD-10-CM

## 2022-08-16 DIAGNOSIS — E11.9 TYPE 2 DIABETES MELLITUS WITHOUT COMPLICATION, WITHOUT LONG-TERM CURRENT USE OF INSULIN: Primary | ICD-10-CM

## 2022-08-16 PROCEDURE — 3008F BODY MASS INDEX DOCD: CPT | Mod: CPTII,,, | Performed by: NURSE PRACTITIONER

## 2022-08-16 PROCEDURE — 1159F MED LIST DOCD IN RCRD: CPT | Mod: CPTII,,, | Performed by: NURSE PRACTITIONER

## 2022-08-16 PROCEDURE — 3074F SYST BP LT 130 MM HG: CPT | Mod: CPTII,,, | Performed by: NURSE PRACTITIONER

## 2022-08-16 PROCEDURE — 3008F PR BODY MASS INDEX (BMI) DOCUMENTED: ICD-10-PCS | Mod: CPTII,,, | Performed by: NURSE PRACTITIONER

## 2022-08-16 PROCEDURE — 99214 OFFICE O/P EST MOD 30 MIN: CPT | Mod: PBBFAC | Performed by: NURSE PRACTITIONER

## 2022-08-16 PROCEDURE — 4010F PR ACE/ARB THEARPY RXD/TAKEN: ICD-10-PCS | Mod: CPTII,,, | Performed by: NURSE PRACTITIONER

## 2022-08-16 PROCEDURE — 3074F PR MOST RECENT SYSTOLIC BLOOD PRESSURE < 130 MM HG: ICD-10-PCS | Mod: CPTII,,, | Performed by: NURSE PRACTITIONER

## 2022-08-16 PROCEDURE — 99214 PR OFFICE/OUTPT VISIT, EST, LEVL IV, 30-39 MIN: ICD-10-PCS | Mod: S$PBB,,, | Performed by: NURSE PRACTITIONER

## 2022-08-16 PROCEDURE — 1159F PR MEDICATION LIST DOCUMENTED IN MEDICAL RECORD: ICD-10-PCS | Mod: CPTII,,, | Performed by: NURSE PRACTITIONER

## 2022-08-16 PROCEDURE — 99214 OFFICE O/P EST MOD 30 MIN: CPT | Mod: S$PBB,,, | Performed by: NURSE PRACTITIONER

## 2022-08-16 PROCEDURE — 3079F PR MOST RECENT DIASTOLIC BLOOD PRESSURE 80-89 MM HG: ICD-10-PCS | Mod: CPTII,,, | Performed by: NURSE PRACTITIONER

## 2022-08-16 PROCEDURE — 4010F ACE/ARB THERAPY RXD/TAKEN: CPT | Mod: CPTII,,, | Performed by: NURSE PRACTITIONER

## 2022-08-16 PROCEDURE — 3079F DIAST BP 80-89 MM HG: CPT | Mod: CPTII,,, | Performed by: NURSE PRACTITIONER

## 2022-08-16 RX ORDER — INDOMETHACIN 25 MG/1
25 CAPSULE ORAL 3 TIMES DAILY PRN
Qty: 90 CAPSULE | Refills: 0 | OUTPATIENT
Start: 2022-08-16 | End: 2022-12-27

## 2022-08-16 NOTE — PROGRESS NOTES
Libia L Dixon, NP   [facility]  [address] [facility] [address]      PATIENT NAME: Neil Finnegan  : 1975  DATE: 22  MRN: [id number]      Billing Provider: Libia Metcalf NP  Level of Service:   Patient PCP Information     Provider PCP Type    Libia Metcalf NP General          Reason for Visit / Chief Complaint: Follow-up (diabetes)       History of Present Illness / Problem Focused Workflow     Neil Finnegan presents to the clinic with Follow-up (diabetes)     46 yo AAM for 4 week follow up with CBG log for uncontrolled DM. CBG log with improved readings. Occasional elevated around 200s when he cheats/ eats late at night. Started walking however he has what he thinks was a gout flare up with pain to top of right foot that lasted for a few days. He took OTC Tylenol with minimal relief. He is compliant with medications as prescribed. He denies any other concerns/ complaints.      Review of Systems     Review of Systems   Constitutional: Negative for appetite change, chills, fatigue, fever and unexpected weight change.   HENT: Negative for congestion, ear pain, hearing loss, sinus pressure, sore throat and tinnitus.    Respiratory: Negative for cough, chest tightness, shortness of breath and wheezing.    Cardiovascular: Negative for chest pain, palpitations and leg swelling.   Gastrointestinal: Negative for abdominal distention, abdominal pain, blood in stool, constipation, diarrhea, nausea and vomiting.   Genitourinary: Negative for dysuria and hematuria.   Musculoskeletal: Negative for arthralgias and myalgias.   Skin: Negative for pallor.   Allergic/Immunologic: Negative for environmental allergies.   Neurological: Negative for dizziness, syncope, weakness, numbness and headaches.   Hematological: Negative for adenopathy. Does not bruise/bleed easily.   Psychiatric/Behavioral: Negative for agitation, behavioral  problems, confusion, hallucinations, sleep disturbance and suicidal ideas. The patient is not nervous/anxious.        Medical / Social / Family History     Past Medical History:   Diagnosis Date    DM (diabetes mellitus)     Hyperlipidemia     Hypertension        Past Surgical History:   Procedure Laterality Date    Bone Spur removed         Social History  Mr. Trejo  reports that he has never smoked. He has never used smokeless tobacco. He reports current alcohol use. He reports previous drug use. Drug: Marijuana.    Family History  Mr. Trejo's family history includes Arthritis in his mother; Diabetes in his paternal aunt; Hyperlipidemia in his father.    Medications and Allergies     Medications  Outpatient Medications Marked as Taking for the 8/16/22 encounter (Office Visit) with Libia Metcalf NP   Medication Sig Dispense Refill    aspirin (ECOTRIN) 81 MG EC tablet Take 81 mg by mouth.      atorvastatin (LIPITOR) 20 MG tablet Take 1 tablet (20 mg total) by mouth once daily. 90 tablet 0    cinnamon bark 500 mg capsule Take 1,000 mg by mouth once daily.      lisinopriL (PRINIVIL,ZESTRIL) 5 MG tablet Take 1 tablet (5 mg total) by mouth once daily. 90 tablet 0    metFORMIN (GLUCOPHAGE) 1000 MG tablet Take 1 tablet (1,000 mg total) by mouth 2 (two) times daily with meals. 180 tablet 0    pioglitazone (ACTOS) 15 MG tablet Take 1 tablet (15 mg total) by mouth once daily. 90 tablet 0    sildenafiL (VIAGRA) 50 MG tablet Take 1 tablet (50 mg total) by mouth daily as needed for Erectile Dysfunction. 5 tablet 0    SITagliptin (JANUVIA) 100 MG Tab Take 1 tablet (100 mg total) by mouth once daily. 90 tablet 0       Allergies  Review of patient's allergies indicates:  No Known Allergies    Physical Examination     Vitals:    08/16/22 0745   BP: 128/85   Pulse: 71   Temp: 98.3 °F (36.8 °C)     Physical Exam  Vitals and nursing note reviewed.   Constitutional:       Appearance: Normal appearance. He is not  ill-appearing.   HENT:      Head: Normocephalic.   Cardiovascular:      Rate and Rhythm: Normal rate and regular rhythm.      Pulses: Normal pulses.   Pulmonary:      Effort: Pulmonary effort is normal. No respiratory distress.      Breath sounds: Normal breath sounds.   Musculoskeletal:         General: Normal range of motion.      Cervical back: Normal range of motion and neck supple.      Right lower leg: No edema.      Left lower leg: No edema.   Skin:     General: Skin is warm and dry.      Capillary Refill: Capillary refill takes less than 2 seconds.   Neurological:      Mental Status: He is alert and oriented to person, place, and time. Mental status is at baseline.      Motor: No weakness.   Psychiatric:         Mood and Affect: Mood normal.         Behavior: Behavior normal.         Thought Content: Thought content normal.         Judgment: Judgment normal.           Results     Lab Results   Component Value Date    WBC 6.6 07/15/2022    RBC 5.16 07/15/2022    HGB 13.8 (L) 07/15/2022    HCT 44.0 07/15/2022    MCV 85.3 07/15/2022    MCH 26.7 (L) 07/15/2022    MCHC 31.4 (L) 07/15/2022    RDW 12.5 07/15/2022     07/15/2022    MPV 11.1 (H) 07/15/2022     Sodium Level   Date Value Ref Range Status   07/15/2022 139 136 - 145 mmol/L Final     Potassium Level   Date Value Ref Range Status   07/15/2022 4.2 3.5 - 5.1 mmol/L Final     Carbon Dioxide   Date Value Ref Range Status   07/15/2022 28 22 - 29 mmol/L Final     Blood Urea Nitrogen   Date Value Ref Range Status   07/15/2022 7.7 (L) 8.9 - 20.6 mg/dL Final     Creatinine   Date Value Ref Range Status   07/15/2022 0.85 0.73 - 1.18 mg/dL Final     Calcium Level Total   Date Value Ref Range Status   07/15/2022 9.6 8.4 - 10.2 mg/dL Final     Albumin Level   Date Value Ref Range Status   07/15/2022 3.5 3.5 - 5.0 gm/dL Final     Bilirubin Total   Date Value Ref Range Status   07/15/2022 0.4 <=1.5 mg/dL Final     Alkaline Phosphatase   Date Value Ref Range Status    07/15/2022 98 40 - 150 unit/L Final     Aspartate Aminotransferase   Date Value Ref Range Status   07/15/2022 25 5 - 34 unit/L Final     Alanine Aminotransferase   Date Value Ref Range Status   07/15/2022 32 0 - 55 unit/L Final     Estimated GFR-Non    Date Value Ref Range Status   11/23/2021 >105 >>=90 mL/min/1.73 m2 Final     Lab Results   Component Value Date    CHOL 136 11/23/2021     Lab Results   Component Value Date    HDL 35 11/23/2021     No results found for: LDLCALC  Lab Results   Component Value Date    TRIG 87 11/23/2021     No results found for: CHOLHDL  Lab Results   Component Value Date    TSH 1.7224 11/23/2021     Lab Results   Component Value Date    PHUR 6.0 11/23/2021    PROTEINUA 10 (A) 11/23/2021    GLUCUA Negative 11/23/2021    KETONESU Negative 11/23/2021    OCCULTUA Negative 11/23/2021    NITRITE Negative 11/23/2021    LEUKOCYTESUR Negative 11/23/2021     Lab Results   Component Value Date    HGBA1C 10.8 (H) 07/15/2022    HGBA1C 9.5 (H) 11/23/2021    HGBA1C 9.1 (H) 08/10/2021     No results found for: MICROALBUR, [id number]   No results found for this or any previous visit.         Assessment and Plan (including Health Maintenance)     Plan:         Health Maintenance Due   Topic Date Due    Pneumococcal Vaccines (Age 0-64) (1 - PCV) Never done    TETANUS VACCINE  Never done    Colorectal Cancer Screening  Never done    COVID-19 Vaccine (3 - Booster for Moderna series) 09/29/2021       Problem List Items Addressed This Visit        Endocrine    Type 2 diabetes mellitus - Primary    Current Assessment & Plan      A1c 10.8% July 15, 2022  CBGs wnl except occasional 200 when he cheats but much improved   Hypoglycemia: denies  Educated on ADA diet: eliminate/decrease high carbohydrate foods (rice, pasta, bread, potatoes (french fries, chips), candy, sweets, fruit juices, canned fruit, junk food, crackers, carbonated beverages)  Educated on health benefits of at least 5 days/  week of 30 minutes moderate intensity exercise (brisk walking) and 2 or more days/ week of muscle strength activities  Avoid alcohol or tobacco use  Eye exam: 12/31/21 [facility] mild NPDR OS  Foot exam: 4/18/22  Per recommendations, continue with ACEI/ARB, Daily ASA 81 mg for CV prevention, Statin therapy  Discussed in detail with patient importance of following ADA Diet and exercise for weight loss. Patient would benefit from Diabetes Education. He states he will not attend. He is not following ADA diet.   Discussed portion controls, carb contents, plate suggestions and foods to limit/ avoid.   CBG log with improved readings with occasional 200s when he cheats/ eats late in the evening  Started walking, encouraged him to continue  Follow up in 2 months with labs and CBG log                    Relevant Orders    Basic Metabolic Panel    Hemoglobin A1C       Orthopedic    Gout    Current Assessment & Plan     He reports history of gout and thinks he might have had a flare up after he started walking. He described pain to top of right foot that lasted x few days. Took OTC Tylenol with mild relief.   Rx Indomethacin as prescribed/ prn pain, avoid other NSAIDs  Uric acid with next labs  Educated on avoiding diets high in purine such as red meats, turkey and wild game, organ meats, seafood; avoid drinks sweetened with high fructose corn syrup; avoid alcohol use.  Educated to continue NSAID therapy for prophylaxis.   Avoid using aspirin containing products.  Educated on importance of increased fluid intake (6-8 glasses of water daily).  Educated on importance of managing co-morbidities such as HTN, Renal disease, DM, HLD.             Relevant Medications    indomethacin (INDOCIN) 25 MG capsule    Other Relevant Orders    Uric Acid      Other Visit Diagnoses     Colon cancer screening        Relevant Orders    Cologuard Screening (Multitarget Stool DNA)          Health Maintenance Topics with due status: Not Due        Topic Last Completion Date    Diabetes Urine Screening 11/23/2021    Lipid Panel 11/23/2021    Eye Exam 12/14/2021    Foot Exam 04/18/2022    Hemoglobin A1c 07/15/2022    Low Dose Statin 08/16/2022    Influenza Vaccine Not Due       Future Appointments   Date Time Provider Department Center   10/17/2022  7:00 AM Libia Metcalf NP [facility]        Follow up in 2 months with labs and CBG log.    Signature:  Libia Metcalf NP  [facility]  [address] [facility] [address]    Date of encounter: 8/16/22

## 2022-08-16 NOTE — ASSESSMENT & PLAN NOTE
He reports history of gout and thinks he might have had a flare up after he started walking. He described pain to top of right foot that lasted x few days. Took OTC Tylenol with mild relief.   Rx Indomethacin as prescribed/ prn pain, avoid other NSAIDs  Uric acid with next labs  Educated on avoiding diets high in purine such as red meats, turkey and wild game, organ meats, seafood; avoid drinks sweetened with high fructose corn syrup; avoid alcohol use.  Educated to continue NSAID therapy for prophylaxis.   Avoid using aspirin containing products.  Educated on importance of increased fluid intake (6-8 glasses of water daily).  Educated on importance of managing co-morbidities such as HTN, Renal disease, DM, HLD.

## 2022-08-16 NOTE — ASSESSMENT & PLAN NOTE
A1c 10.8% July 15, 2022  CBGs wnl except occasional 200 when he cheats but much improved   Hypoglycemia: denies  Educated on ADA diet: eliminate/decrease high carbohydrate foods (rice, pasta, bread, potatoes (french fries, chips), candy, sweets, fruit juices, canned fruit, junk food, crackers, carbonated beverages)  Educated on health benefits of at least 5 days/ week of 30 minutes moderate intensity exercise (brisk walking) and 2 or more days/ week of muscle strength activities  Avoid alcohol or tobacco use  Eye exam: 12/31/21 [facility] mild NPDR OS  Foot exam: 4/18/22  Per recommendations, continue with ACEI/ARB, Daily ASA 81 mg for CV prevention, Statin therapy  Discussed in detail with patient importance of following ADA Diet and exercise for weight loss. Patient would benefit from Diabetes Education. He states he will not attend. He is not following ADA diet.   Discussed portion controls, carb contents, plate suggestions and foods to limit/ avoid.   CBG log with improved readings with occasional 200s when he cheats/ eats late in the evening  Started walking, encouraged him to continue  Follow up in 2 months with labs and CBG log

## 2022-11-18 ENCOUNTER — LAB VISIT (OUTPATIENT)
Dept: LAB | Facility: HOSPITAL | Age: 47
End: 2022-11-18
Attending: NURSE PRACTITIONER
Payer: MEDICAID

## 2022-11-18 DIAGNOSIS — E11.9 TYPE 2 DIABETES MELLITUS WITHOUT COMPLICATION, WITHOUT LONG-TERM CURRENT USE OF INSULIN: ICD-10-CM

## 2022-11-18 DIAGNOSIS — M10.9 GOUT, UNSPECIFIED CAUSE, UNSPECIFIED CHRONICITY, UNSPECIFIED SITE: ICD-10-CM

## 2022-11-18 LAB
ANION GAP SERPL CALC-SCNC: 10 MEQ/L
BUN SERPL-MCNC: 11.2 MG/DL (ref 8.9–20.6)
CALCIUM SERPL-MCNC: 9.6 MG/DL (ref 8.4–10.2)
CHLORIDE SERPL-SCNC: 99 MMOL/L (ref 98–107)
CO2 SERPL-SCNC: 29 MMOL/L (ref 22–29)
CREAT SERPL-MCNC: 0.96 MG/DL (ref 0.73–1.18)
CREAT/UREA NIT SERPL: 12
EST. AVERAGE GLUCOSE BLD GHB EST-MCNC: 269 MG/DL
GFR SERPLBLD CREATININE-BSD FMLA CKD-EPI: >60 MLS/MIN/1.73/M2
GLUCOSE SERPL-MCNC: 179 MG/DL (ref 74–100)
HBA1C MFR BLD: 11 %
POTASSIUM SERPL-SCNC: 4.4 MMOL/L (ref 3.5–5.1)
SODIUM SERPL-SCNC: 138 MMOL/L (ref 136–145)
URATE SERPL-MCNC: 6.3 MG/DL (ref 3.5–7.2)

## 2022-11-18 PROCEDURE — 80048 BASIC METABOLIC PNL TOTAL CA: CPT

## 2022-11-18 PROCEDURE — 36415 COLL VENOUS BLD VENIPUNCTURE: CPT

## 2022-11-18 PROCEDURE — 84550 ASSAY OF BLOOD/URIC ACID: CPT

## 2022-11-18 PROCEDURE — 83036 HEMOGLOBIN GLYCOSYLATED A1C: CPT

## 2022-11-22 ENCOUNTER — OFFICE VISIT (OUTPATIENT)
Dept: INTERNAL MEDICINE | Facility: CLINIC | Age: 47
End: 2022-11-22
Payer: MEDICAID

## 2022-11-22 VITALS
HEART RATE: 81 BPM | TEMPERATURE: 98 F | BODY MASS INDEX: 44.11 KG/M2 | SYSTOLIC BLOOD PRESSURE: 119 MMHG | WEIGHT: 297.81 LBS | RESPIRATION RATE: 20 BRPM | HEIGHT: 69 IN | DIASTOLIC BLOOD PRESSURE: 84 MMHG

## 2022-11-22 DIAGNOSIS — K76.0 STEATOSIS OF LIVER: ICD-10-CM

## 2022-11-22 DIAGNOSIS — E78.2 MIXED HYPERLIPIDEMIA: ICD-10-CM

## 2022-11-22 DIAGNOSIS — M10.9 GOUT, UNSPECIFIED CAUSE, UNSPECIFIED CHRONICITY, UNSPECIFIED SITE: ICD-10-CM

## 2022-11-22 DIAGNOSIS — I10 HYPERTENSION, UNSPECIFIED TYPE: ICD-10-CM

## 2022-11-22 DIAGNOSIS — E11.9 TYPE 2 DIABETES MELLITUS WITHOUT COMPLICATION, WITHOUT LONG-TERM CURRENT USE OF INSULIN: Primary | ICD-10-CM

## 2022-11-22 DIAGNOSIS — Z12.11 COLON CANCER SCREENING: ICD-10-CM

## 2022-11-22 PROCEDURE — 4010F PR ACE/ARB THEARPY RXD/TAKEN: ICD-10-PCS | Mod: CPTII,,, | Performed by: NURSE PRACTITIONER

## 2022-11-22 PROCEDURE — 3074F SYST BP LT 130 MM HG: CPT | Mod: CPTII,,, | Performed by: NURSE PRACTITIONER

## 2022-11-22 PROCEDURE — 1159F MED LIST DOCD IN RCRD: CPT | Mod: CPTII,,, | Performed by: NURSE PRACTITIONER

## 2022-11-22 PROCEDURE — 1159F PR MEDICATION LIST DOCUMENTED IN MEDICAL RECORD: ICD-10-PCS | Mod: CPTII,,, | Performed by: NURSE PRACTITIONER

## 2022-11-22 PROCEDURE — 99214 PR OFFICE/OUTPT VISIT, EST, LEVL IV, 30-39 MIN: ICD-10-PCS | Mod: S$PBB,,, | Performed by: NURSE PRACTITIONER

## 2022-11-22 PROCEDURE — 3074F PR MOST RECENT SYSTOLIC BLOOD PRESSURE < 130 MM HG: ICD-10-PCS | Mod: CPTII,,, | Performed by: NURSE PRACTITIONER

## 2022-11-22 PROCEDURE — 99214 OFFICE O/P EST MOD 30 MIN: CPT | Mod: S$PBB,,, | Performed by: NURSE PRACTITIONER

## 2022-11-22 PROCEDURE — 4010F ACE/ARB THERAPY RXD/TAKEN: CPT | Mod: CPTII,,, | Performed by: NURSE PRACTITIONER

## 2022-11-22 PROCEDURE — 3079F PR MOST RECENT DIASTOLIC BLOOD PRESSURE 80-89 MM HG: ICD-10-PCS | Mod: CPTII,,, | Performed by: NURSE PRACTITIONER

## 2022-11-22 PROCEDURE — 99214 OFFICE O/P EST MOD 30 MIN: CPT | Mod: PBBFAC | Performed by: NURSE PRACTITIONER

## 2022-11-22 PROCEDURE — 3008F BODY MASS INDEX DOCD: CPT | Mod: CPTII,,, | Performed by: NURSE PRACTITIONER

## 2022-11-22 PROCEDURE — 1160F RVW MEDS BY RX/DR IN RCRD: CPT | Mod: CPTII,,, | Performed by: NURSE PRACTITIONER

## 2022-11-22 PROCEDURE — 1160F PR REVIEW ALL MEDS BY PRESCRIBER/CLIN PHARMACIST DOCUMENTED: ICD-10-PCS | Mod: CPTII,,, | Performed by: NURSE PRACTITIONER

## 2022-11-22 PROCEDURE — 3008F PR BODY MASS INDEX (BMI) DOCUMENTED: ICD-10-PCS | Mod: CPTII,,, | Performed by: NURSE PRACTITIONER

## 2022-11-22 PROCEDURE — 3079F DIAST BP 80-89 MM HG: CPT | Mod: CPTII,,, | Performed by: NURSE PRACTITIONER

## 2022-11-22 RX ORDER — METFORMIN HYDROCHLORIDE 1000 MG/1
1000 TABLET ORAL 2 TIMES DAILY WITH MEALS
Qty: 180 TABLET | Refills: 2 | Status: SHIPPED | OUTPATIENT
Start: 2022-11-22

## 2022-11-22 RX ORDER — ATORVASTATIN CALCIUM 20 MG/1
20 TABLET, FILM COATED ORAL DAILY
Qty: 90 TABLET | Refills: 2 | Status: SHIPPED | OUTPATIENT
Start: 2022-11-22

## 2022-11-22 RX ORDER — PIOGLITAZONEHYDROCHLORIDE 15 MG/1
15 TABLET ORAL DAILY
Qty: 90 TABLET | Refills: 2 | Status: SHIPPED | OUTPATIENT
Start: 2022-11-22

## 2022-11-22 RX ORDER — LISINOPRIL 5 MG/1
5 TABLET ORAL DAILY
Qty: 90 TABLET | Refills: 2 | Status: SHIPPED | OUTPATIENT
Start: 2022-11-22

## 2022-11-22 RX ORDER — ASPIRIN 81 MG/1
81 TABLET ORAL DAILY
Qty: 90 TABLET | Refills: 3 | Status: SHIPPED | OUTPATIENT
Start: 2022-11-22

## 2022-11-22 NOTE — PROGRESS NOTES
"  Libia Metcalf NP   [facility]  [address] [facility] [address]      PATIENT NAME: Neil Finnegan  : 1975  DATE: 22  MRN: [id number]      Billing Provider: Libia Metcalf NP  Level of Service: MD OFFICE/OUTPT VISIT, EST, LEVL IV, 30-39 MIN  Patient PCP Information       Provider PCP Type    Libia Metcalf NP General            Reason for Visit / Chief Complaint: Follow-up (Lab results)       History of Present Illness / Problem Focused Workflow     Neil Finnegan presents to the clinic with Follow-up (Lab results)     48 yo AAM for follow up on DM. PMH includes type 2 DM, hepatic steatosis, gout, adrenal myelolipoma, right nephrolithiasis, morbid obesity. /84. Labs reviewed with A1c worsened to 11%. He states he "fell off." He admits to non compliance with CBG checks and medications. He states he resumed medications this week. He will get back on track he states. Uric acid 6.3. Needs refills of medications. Otherwise, denies CP, SOB, HA, dizziness.       Review of Systems     Review of Systems   Constitutional:  Negative for appetite change, chills, fatigue, fever and unexpected weight change.   HENT:  Negative for congestion, ear pain, hearing loss, sinus pressure, sore throat and tinnitus.    Respiratory:  Negative for cough, chest tightness, shortness of breath and wheezing.    Cardiovascular:  Negative for chest pain, palpitations and leg swelling.   Gastrointestinal:  Negative for abdominal distention, abdominal pain, blood in stool, constipation, diarrhea, nausea and vomiting.   Genitourinary:  Negative for dysuria and hematuria.   Musculoskeletal:  Negative for arthralgias and myalgias.   Skin:  Negative for pallor.   Allergic/Immunologic: Negative for environmental allergies.   Neurological:  Negative for dizziness, syncope, weakness, numbness and headaches.   Hematological:  Negative for adenopathy. " Does not bruise/bleed easily.   Psychiatric/Behavioral:  Negative for agitation, behavioral problems, confusion, hallucinations, sleep disturbance and suicidal ideas. The patient is not nervous/anxious.      Medical / Social / Family History     Past Medical History:   Diagnosis Date    DM (diabetes mellitus)     Hyperlipidemia     Hypertension        Past Surgical History:   Procedure Laterality Date    Bone Spur removed         Social History  Mr. Trejo  reports that he has never smoked. He has never used smokeless tobacco. He reports current alcohol use. He reports that he does not currently use drugs after having used the following drugs: Marijuana.    Family History  Mr. Trejo's family history includes Arthritis in his mother; Diabetes in his paternal aunt; Hyperlipidemia in his father.    Medications and Allergies     Medications  Medication List with Changes/Refills   Current Medications    CINNAMON BARK 500 MG CAPSULE    Take 1,000 mg by mouth once daily.    INDOMETHACIN (INDOCIN) 25 MG CAPSULE    Take 1 capsule (25 mg total) by mouth 3 (three) times daily as needed (pain). Take with food. Avoid other NSAIDs.    SILDENAFIL (VIAGRA) 50 MG TABLET    Take 1 tablet (50 mg total) by mouth daily as needed for Erectile Dysfunction.   Changed and/or Refilled Medications    Modified Medication Previous Medication    ASPIRIN (ECOTRIN) 81 MG EC TABLET aspirin (ECOTRIN) 81 MG EC tablet       Take 1 tablet (81 mg total) by mouth once daily.    Take 81 mg by mouth.    ATORVASTATIN (LIPITOR) 20 MG TABLET atorvastatin (LIPITOR) 20 MG tablet       Take 1 tablet (20 mg total) by mouth once daily.    Take 1 tablet (20 mg total) by mouth once daily.    LISINOPRIL (PRINIVIL,ZESTRIL) 5 MG TABLET lisinopriL (PRINIVIL,ZESTRIL) 5 MG tablet       Take 1 tablet (5 mg total) by mouth once daily.    Take 1 tablet (5 mg total) by mouth once daily.    METFORMIN (GLUCOPHAGE) 1000 MG TABLET metFORMIN (GLUCOPHAGE) 1000 MG tablet        Take 1 tablet (1,000 mg total) by mouth 2 (two) times daily with meals.    Take 1 tablet (1,000 mg total) by mouth 2 (two) times daily with meals.    PIOGLITAZONE (ACTOS) 15 MG TABLET pioglitazone (ACTOS) 15 MG tablet       Take 1 tablet (15 mg total) by mouth once daily.    Take 1 tablet (15 mg total) by mouth once daily.    SITAGLIPTIN PHOSPHATE (JANUVIA) 100 MG TAB SITagliptin (JANUVIA) 100 MG Tab       Take 1 tablet (100 mg total) by mouth once daily.    Take 1 tablet (100 mg total) by mouth once daily.           Allergies  Review of patient's allergies indicates:  No Known Allergies    Physical Examination     Vitals:    11/22/22 0724   BP: 119/84   Pulse: 81   Resp: 20   Temp: 97.9 °F (36.6 °C)     Physical Exam  Vitals and nursing note reviewed.   Constitutional:       Appearance: Normal appearance. He is not ill-appearing.   HENT:      Head: Normocephalic.   Neck:      Vascular: No carotid bruit.   Cardiovascular:      Rate and Rhythm: Normal rate and regular rhythm.      Pulses: Normal pulses.   Pulmonary:      Effort: Pulmonary effort is normal. No respiratory distress.      Breath sounds: Normal breath sounds.   Musculoskeletal:         General: Normal range of motion.      Cervical back: Normal range of motion and neck supple. No tenderness.      Right lower leg: No edema.      Left lower leg: No edema.   Lymphadenopathy:      Cervical: No cervical adenopathy.   Skin:     General: Skin is warm and dry.      Capillary Refill: Capillary refill takes less than 2 seconds.   Neurological:      Mental Status: He is alert and oriented to person, place, and time. Mental status is at baseline.      Motor: No weakness.   Psychiatric:         Mood and Affect: Mood normal.         Behavior: Behavior normal.         Thought Content: Thought content normal.         Judgment: Judgment normal.         Results     Lab Results   Component Value Date    WBC 6.6 07/15/2022    RBC 5.16 07/15/2022    HGB 13.8 (L) 07/15/2022     HCT 44.0 07/15/2022    MCV 85.3 07/15/2022    MCH 26.7 (L) 07/15/2022    MCHC 31.4 (L) 07/15/2022    RDW 12.5 07/15/2022     07/15/2022    MPV 11.1 (H) 07/15/2022     Sodium Level   Date Value Ref Range Status   11/18/2022 138 136 - 145 mmol/L Final     Potassium Level   Date Value Ref Range Status   11/18/2022 4.4 3.5 - 5.1 mmol/L Final     Carbon Dioxide   Date Value Ref Range Status   11/18/2022 29 22 - 29 mmol/L Final     Blood Urea Nitrogen   Date Value Ref Range Status   11/18/2022 11.2 8.9 - 20.6 mg/dL Final     Creatinine   Date Value Ref Range Status   11/18/2022 0.96 0.73 - 1.18 mg/dL Final     Calcium Level Total   Date Value Ref Range Status   11/18/2022 9.6 8.4 - 10.2 mg/dL Final     Albumin Level   Date Value Ref Range Status   07/15/2022 3.5 3.5 - 5.0 gm/dL Final     Bilirubin Total   Date Value Ref Range Status   07/15/2022 0.4 <=1.5 mg/dL Final     Alkaline Phosphatase   Date Value Ref Range Status   07/15/2022 98 40 - 150 unit/L Final     Aspartate Aminotransferase   Date Value Ref Range Status   07/15/2022 25 5 - 34 unit/L Final     Alanine Aminotransferase   Date Value Ref Range Status   07/15/2022 32 0 - 55 unit/L Final     Estimated GFR-Non    Date Value Ref Range Status   11/23/2021 >105 >>=90 mL/min/1.73 m2 Final     Lab Results   Component Value Date    CHOL 136 11/23/2021     Lab Results   Component Value Date    HDL 35 11/23/2021     No results found for: LDLCALC  Lab Results   Component Value Date    TRIG 87 11/23/2021     No results found for: CHOLHDL  Lab Results   Component Value Date    TSH 1.7224 11/23/2021     Lab Results   Component Value Date    PHUR 6.0 11/23/2021    PROTEINUA 10 (A) 11/23/2021    GLUCUA Negative 11/23/2021    KETONESU Negative 11/23/2021    OCCULTUA Negative 11/23/2021    NITRITE Negative 11/23/2021    LEUKOCYTESUR Negative 11/23/2021     Lab Results   Component Value Date    HGBA1C 11.0 (H) 11/18/2022    HGBA1C 10.8 (H) 07/15/2022     HGBA1C 9.5 (H) 11/23/2021     No results found for: MICROALBUR, [id number]   No results found for this or any previous visit.         Assessment and Plan (including Health Maintenance)     Plan:         Health Maintenance Due   Topic Date Due    Pneumococcal Vaccines (Age 0-64) (1 - PCV) Never done    TETANUS VACCINE  Never done    Colorectal Cancer Screening  Never done    COVID-19 Vaccine (3 - Booster for Moderna series) 06/24/2021    Influenza Vaccine (1) Never done    Diabetes Urine Screening  11/23/2022    Eye Exam  12/13/2022       Problem List Items Addressed This Visit          Cardiac/Vascular    Hyperlipidemia    Current Assessment & Plan     LDL 70, Trig 166, HLD 34, Total 137 April 2022   Avoid tobacco/ alcohol  Follow low fat/low cholesterol diet such as avoid/ decrease morrell, sausage, fried foods, cookies, cakes, chips, cheese, whole milk, butter, mayonnaise. Add olive oil, avocados, lean meats, fresh fruits/ vegetables, heart healthy nuts to diet.  Educated on health benefits of exercise 5 days/ week of at least 30 minutes moderate intensity exercise (brisk walking) and 2 days/ week of muscle strength activities  Daily ASA 81 mg for CV prevention  Continue current medication regimen           Relevant Medications    atorvastatin (LIPITOR) 20 MG tablet    Hypertension    Current Assessment & Plan     /84  Follow low sodium diet, < 2 gm/day (avoid high salty foods such as processed meats/ sausage/morrell/ sandwich meat, chips, pickles, cheese, crackers and soft drinks/ electrolyte replacement drinks).  Avoid tobacco/ alcohol use  Educated on health benefits of at least 5 days/ week of 30 minutes moderate intensity exercise (brisk walking) and 2 or more days/ week of muscle strength activities  Daily ASA 81 mg for CV prevention  Continue current medication regimen           Relevant Medications    lisinopriL (PRINIVIL,ZESTRIL) 5 MG tablet    aspirin (ECOTRIN) 81 MG EC tablet       Endocrine    Type 2  "diabetes mellitus - Primary    Current Assessment & Plan      A1c 11%; Prior A1c 10.8%- non compliant with medications/ sugar checks, etc  He states he "fell off" but getting back on track and resumed medications this week   Hypoglycemia: denies  Educated on ADA diet: eliminate/decrease high carbohydrate foods (rice, pasta, bread, potatoes (french fries, chips), candy, sweets, fruit juices, canned fruit, junk food, crackers, carbonated beverages)  Educated on health benefits of at least 5 days/ week of 30 minutes moderate intensity exercise (brisk walking) and 2 or more days/ week of muscle strength activities  Avoid alcohol or tobacco use  Eye exam: 12/31/21 [facility] mild NPDR OS  Foot exam: 4/18/22  Per recommendations, continue with ACEI/ARB, Daily ASA 81 mg for CV prevention, Statin therapy  Patient to resume medications as prescribed, unable to adjust due to non compliance and encouraged compliance with medications, diet modifications, sugar checks  F/u in 1 mo with labs and cbg log                 Relevant Medications    SITagliptin phosphate (JANUVIA) 100 MG Tab    pioglitazone (ACTOS) 15 MG tablet    metFORMIN (GLUCOPHAGE) 1000 MG tablet    lisinopriL (PRINIVIL,ZESTRIL) 5 MG tablet    atorvastatin (LIPITOR) 20 MG tablet    aspirin (ECOTRIN) 81 MG EC tablet       GI    Steatosis of liver    Current Assessment & Plan     Low fat/ chol diet, limit tylenol/ alcohol, regular exercise/ lower BMI < 30  Control of co-morbidities  Abd US ordered          Relevant Orders    US Abdomen Limited_Liver       Orthopedic    Gout    Current Assessment & Plan     Uric acid 6.3  Low purine diet encouraged          Other Visit Diagnoses       Colon cancer screening      He refuses to complete at this time but has supplies at home.            Health Maintenance Topics with due status: Not Due       Topic Last Completion Date    Lipid Panel 11/23/2021    Foot Exam 04/18/2022    Hemoglobin A1c 11/18/2022    Low Dose Statin " 11/22/2022       Future Appointments   Date Time Provider Department Center   12/20/2022  7:30 AM Libia Metcalf NP [facility]      Follow up in 4 weeks with labs/ Cbg log       Signature:  Libia Metcalf NP  [facility]  [address] [facility] [address]    Date of encounter: 11/22/22

## 2022-11-22 NOTE — ASSESSMENT & PLAN NOTE
" A1c 11%; Prior A1c 10.8%- non compliant with medications/ sugar checks, etc  He states he "fell off" but getting back on track and resumed medications this week   Hypoglycemia: denies  Educated on ADA diet: eliminate/decrease high carbohydrate foods (rice, pasta, bread, potatoes (french fries, chips), candy, sweets, fruit juices, canned fruit, junk food, crackers, carbonated beverages)  Educated on health benefits of at least 5 days/ week of 30 minutes moderate intensity exercise (brisk walking) and 2 or more days/ week of muscle strength activities  Avoid alcohol or tobacco use  Eye exam: 12/31/21 [facility] mild NPDR OS  Foot exam: 4/18/22  Per recommendations, continue with ACEI/ARB, Daily ASA 81 mg for CV prevention, Statin therapy  Patient to resume medications as prescribed, unable to adjust due to non compliance and encouraged compliance with medications, diet modifications, sugar checks  F/u in 1 mo with labs and cbg log          "

## 2022-11-22 NOTE — ASSESSMENT & PLAN NOTE
/84  Follow low sodium diet, < 2 gm/day (avoid high salty foods such as processed meats/ sausage/morrell/ sandwich meat, chips, pickles, cheese, crackers and soft drinks/ electrolyte replacement drinks).  Avoid tobacco/ alcohol use  Educated on health benefits of at least 5 days/ week of 30 minutes moderate intensity exercise (brisk walking) and 2 or more days/ week of muscle strength activities  Daily ASA 81 mg for CV prevention  Continue current medication regimen

## 2022-11-22 NOTE — ASSESSMENT & PLAN NOTE
Low fat/ chol diet, limit tylenol/ alcohol, regular exercise/ lower BMI < 30  Control of co-morbidities  Abd US ordered

## 2022-11-29 ENCOUNTER — HOSPITAL ENCOUNTER (OUTPATIENT)
Dept: RADIOLOGY | Facility: HOSPITAL | Age: 47
Discharge: HOME | End: 2022-11-29
Attending: NURSE PRACTITIONER
Payer: MEDICAID

## 2022-11-29 ENCOUNTER — TELEPHONE (OUTPATIENT)
Dept: INTERNAL MEDICINE | Facility: CLINIC | Age: 47
End: 2022-11-29
Payer: MEDICAID

## 2022-11-29 DIAGNOSIS — K76.0 STEATOSIS OF LIVER: ICD-10-CM

## 2022-11-29 PROCEDURE — 76705 ECHO EXAM OF ABDOMEN: CPT | Mod: TC

## 2022-11-29 NOTE — TELEPHONE ENCOUNTER
Please inform patient abdominal US results show fatty liver. Otherwise, no other abnormal or concerning findings. Encourage overall healthy lifestyle habits, healthy diet, limit Tylenol/ alcohol intake and encourage regular exercise to help lower BMI.     We will continue to monitor with yearly US.    Thank you

## 2022-12-27 ENCOUNTER — HOSPITAL ENCOUNTER (EMERGENCY)
Facility: HOSPITAL | Age: 47
Discharge: HOME | End: 2022-12-27
Attending: FAMILY MEDICINE
Payer: MEDICAID

## 2022-12-27 VITALS
DIASTOLIC BLOOD PRESSURE: 84 MMHG | OXYGEN SATURATION: 96 % | TEMPERATURE: 99 F | SYSTOLIC BLOOD PRESSURE: 145 MMHG | HEART RATE: 93 BPM | WEIGHT: 288.81 LBS | RESPIRATION RATE: 18 BRPM | BODY MASS INDEX: 42.78 KG/M2 | HEIGHT: 69 IN

## 2022-12-27 DIAGNOSIS — J06.9 VIRAL URI WITH COUGH: Primary | ICD-10-CM

## 2022-12-27 LAB
FLUAV AG UPPER RESP QL IA.RAPID: NOT DETECTED
FLUBV AG UPPER RESP QL IA.RAPID: NOT DETECTED
SARS-COV-2 RNA RESP QL NAA+PROBE: NOT DETECTED

## 2022-12-27 PROCEDURE — 63600175 PHARM REV CODE 636 W HCPCS: Performed by: PHYSICIAN ASSISTANT

## 2022-12-27 PROCEDURE — 96372 THER/PROPH/DIAG INJ SC/IM: CPT | Performed by: PHYSICIAN ASSISTANT

## 2022-12-27 PROCEDURE — 99284 EMERGENCY DEPT VISIT MOD MDM: CPT

## 2022-12-27 PROCEDURE — 0240U COVID/FLU A&B PCR: CPT | Performed by: PHYSICIAN ASSISTANT

## 2022-12-27 RX ORDER — INDOMETHACIN 50 MG/1
50 CAPSULE ORAL 3 TIMES DAILY PRN
Qty: 20 CAPSULE | Refills: 0 | Status: SHIPPED | OUTPATIENT
Start: 2022-12-27

## 2022-12-27 RX ORDER — KETOROLAC TROMETHAMINE 30 MG/ML
30 INJECTION, SOLUTION INTRAMUSCULAR; INTRAVENOUS
Status: COMPLETED | OUTPATIENT
Start: 2022-12-27 | End: 2022-12-27

## 2022-12-27 RX ORDER — PROMETHAZINE HYDROCHLORIDE AND DEXTROMETHORPHAN HYDROBROMIDE 6.25; 15 MG/5ML; MG/5ML
5 SYRUP ORAL EVERY 6 HOURS PRN
Qty: 100 ML | Refills: 0 | Status: SHIPPED | OUTPATIENT
Start: 2022-12-27 | End: 2023-01-01

## 2022-12-27 RX ORDER — FLUTICASONE PROPIONATE 50 MCG
2 SPRAY, SUSPENSION (ML) NASAL DAILY
Qty: 15 G | Refills: 0 | Status: SHIPPED | OUTPATIENT
Start: 2022-12-27

## 2022-12-27 RX ADMIN — KETOROLAC TROMETHAMINE 30 MG: 30 INJECTION, SOLUTION INTRAMUSCULAR; INTRAVENOUS at 06:12

## 2022-12-27 NOTE — Clinical Note
"Neil Meraony" Kain was seen and treated in our emergency department on 12/27/2022.  He may return to work on 12/30/2022.       If you have any questions or concerns, please don't hesitate to call.      TIN Keane"

## 2022-12-28 NOTE — ED PROVIDER NOTES
Encounter Date: 12/27/2022       History     Chief Complaint   Patient presents with    Cough     COUGH AND SINUS CONGESTION X 2 DAYS.       46 yo M w/ PMHx significant for DM, HTN, HLD & obesity presents to ED c/o 2 day hx of congestion, rhinorrhea, post-nasal drainage, cough, body aches & fatigue. Reports several people at work have been sick w/ URI symptoms. Denies CP, SOB, hemoptysis, wheezing, palpitations, diaphoresis, syncope, orthopnea, edema, N/V/D, abdominal pain, HA, vision changes, photophobia, neck stiffness, fever, sore throat. VSS on arrival w/ NAD.    Review of patient's allergies indicates:  No Known Allergies  Past Medical History:   Diagnosis Date    DM (diabetes mellitus)     Hyperlipidemia     Hypertension      Past Surgical History:   Procedure Laterality Date    Bone Spur removed       Family History   Problem Relation Age of Onset    Arthritis Mother     Hyperlipidemia Father     Diabetes Paternal Aunt      Social History     Tobacco Use    Smoking status: Never    Smokeless tobacco: Never   Substance Use Topics    Alcohol use: Yes     Comment: on occassion    Drug use: Not Currently     Types: Marijuana     Review of Systems   Constitutional:  Positive for chills and fatigue. Negative for appetite change, diaphoresis and fever.   HENT:  Positive for congestion, postnasal drip and rhinorrhea. Negative for ear discharge, ear pain, sinus pressure, sinus pain and sore throat.    Eyes:  Negative for photophobia and visual disturbance.   Respiratory:  Positive for cough. Negative for shortness of breath, wheezing and stridor.    Cardiovascular:  Negative for chest pain, palpitations and leg swelling.   Gastrointestinal:  Negative for abdominal pain, diarrhea, nausea and vomiting.   Musculoskeletal:  Negative for arthralgias, back pain, myalgias, neck pain and neck stiffness.   Skin:  Negative for color change, pallor and rash.   Allergic/Immunologic: Negative for immunocompromised state.    Neurological:  Negative for dizziness, syncope, weakness and headaches.   Hematological:  Negative for adenopathy.   Psychiatric/Behavioral:  Negative for confusion.    All other systems reviewed and are negative.    Physical Exam     Initial Vitals [12/27/22 1738]   BP Pulse Resp Temp SpO2   (!) 145/84 93 18 99.1 °F (37.3 °C) 96 %      MAP       --         Physical Exam    Nursing note and vitals reviewed.  Constitutional: He appears well-developed and well-nourished. He is not diaphoretic. No distress.   HENT:   Head: Normocephalic and atraumatic.   Right Ear: Hearing, tympanic membrane, external ear and ear canal normal.   Left Ear: Hearing, tympanic membrane, external ear and ear canal normal.   Nose: Mucosal edema and rhinorrhea present.   Mouth/Throat: Uvula is midline, oropharynx is clear and moist and mucous membranes are normal. No trismus in the jaw. No uvula swelling. No oropharyngeal exudate, posterior oropharyngeal edema, posterior oropharyngeal erythema or tonsillar abscesses.   Eyes: Conjunctivae and EOM are normal. Pupils are equal, round, and reactive to light.   Neck: Neck supple. No JVD present.   Normal range of motion.   Full passive range of motion without pain.     Cardiovascular:  Normal rate, regular rhythm, normal heart sounds and intact distal pulses.     Exam reveals no gallop and no friction rub.       No murmur heard.  Pulmonary/Chest: Breath sounds normal. No stridor. No respiratory distress. He has no wheezes. He has no rhonchi. He has no rales.   Abdominal: Abdomen is soft. Bowel sounds are normal. He exhibits no distension. There is no abdominal tenderness. There is no rebound and no guarding.   Musculoskeletal:         General: No tenderness or edema. Normal range of motion.      Cervical back: Full passive range of motion without pain, normal range of motion and neck supple. No rigidity. Normal range of motion.     Lymphadenopathy:     He has no cervical adenopathy.    Neurological: He is alert and oriented to person, place, and time. No cranial nerve deficit or sensory deficit.   Skin: Skin is warm and dry. Capillary refill takes less than 2 seconds. No rash noted. No erythema. No pallor.   Psychiatric: He has a normal mood and affect.       ED Course   Procedures  Labs Reviewed   COVID/FLU A&B PCR - Normal    Narrative:     The Xpert Xpress SARS-CoV-2/FLU/RSV plus is a rapid, multiplexed real-time PCR test intended for the simultaneous qualitative detection and differentiation of SARS-CoV-2, Influenza A, Influenza B, and respiratory syncytial virus (RSV) viral RNA in either nasopharyngeal swab or nasal swab specimens.                Imaging Results    None          Medications   ketorolac injection 30 mg (30 mg Intramuscular Given 12/27/22 1835)     Medical Decision Making:   Clinical Tests:   Lab Tests: Ordered and Reviewed  All swabs negative. Lungs CTA & no respiratory distress in ED. Will discharge w/ meds for symptom relief. Instructed to follow-up w/ PCP. ED precautions given.                        Clinical Impression:   Final diagnoses:  [J06.9] Viral URI with cough (Primary)        ED Disposition Condition    Discharge Stable          ED Prescriptions       Medication Sig Dispense Start Date End Date Auth. Provider    promethazine-dextromethorphan (PROMETHAZINE-DM) 6.25-15 mg/5 mL Syrp Take 5 mLs by mouth every 6 (six) hours as needed (cough). 100 mL 12/27/2022 1/1/2023 TIN Keane    indomethacin (INDOCIN) 50 MG capsule Take 1 capsule (50 mg total) by mouth 3 (three) times daily as needed (Pain). Take with food 20 capsule 12/27/2022 -- TIN Keane    fluticasone propionate (FLONASE) 50 mcg/actuation nasal spray 2 sprays (100 mcg total) by Each Nostril route once daily. 15 g 12/27/2022 -- TIN Keane          Follow-up Information       Follow up With Specialties Details Why Contact Info    Libia Metcalf NP Nurse Practitioner Call in 1 day   [address] [facility] [address]  [phone]      [facility] Emergency Medicine  As needed, If symptoms worsen [address] [facility] [id number] [phone]             TIN Keane  12/27/22 4946

## 2022-12-28 NOTE — DISCHARGE INSTRUCTIONS
Report to Emergency Department if symptoms return or worsen; [facility] Within 1 to 2 days, It is important that you follow up with your primary care provider or specialist if indicated for further evaluation, workup, and treatment as necessary. The exam and treatment you received in Emergency Department was for an urgent problem and NOT INTENDED AS COMPLETE CARE. It is important that you FOLLOW UP with a doctor for ongoing care. If your symptoms become WORSE or you DO NOT IMPROVE and you are unable to reach your health care provider, you should RETURN to the Emergency Department. The Emergency Department provider has provided a PRELIMINARY INTERPRETATION of all your studies. A final interpretation may be done after you are discharged. If a change in your diagnosis or treatment is needed WE WILL CONTACT YOU. It is critical that we have a CURRENT PHONE NUMBER FOR YOU.

## 2023-02-08 ENCOUNTER — PATIENT MESSAGE (OUTPATIENT)
Dept: ADMINISTRATIVE | Facility: HOSPITAL | Age: 48
End: 2023-02-08
Payer: MEDICAID

## 2023-05-05 ENCOUNTER — HOSPITAL ENCOUNTER (EMERGENCY)
Facility: HOSPITAL | Age: 48
Discharge: HOME | End: 2023-05-05
Attending: INTERNAL MEDICINE
Payer: MEDICAID

## 2023-05-05 VITALS
OXYGEN SATURATION: 100 % | DIASTOLIC BLOOD PRESSURE: 80 MMHG | SYSTOLIC BLOOD PRESSURE: 140 MMHG | WEIGHT: 271.19 LBS | HEIGHT: 69 IN | RESPIRATION RATE: 16 BRPM | TEMPERATURE: 98 F | HEART RATE: 78 BPM | BODY MASS INDEX: 40.17 KG/M2

## 2023-05-05 DIAGNOSIS — J02.9 VIRAL PHARYNGITIS: Primary | ICD-10-CM

## 2023-05-05 LAB — STREP A PCR (OHS): NOT DETECTED

## 2023-05-05 PROCEDURE — 87651 STREP A DNA AMP PROBE: CPT | Performed by: STUDENT IN AN ORGANIZED HEALTH CARE EDUCATION/TRAINING PROGRAM

## 2023-05-05 PROCEDURE — 63600175 PHARM REV CODE 636 W HCPCS: Performed by: STUDENT IN AN ORGANIZED HEALTH CARE EDUCATION/TRAINING PROGRAM

## 2023-05-05 PROCEDURE — 96372 THER/PROPH/DIAG INJ SC/IM: CPT | Performed by: STUDENT IN AN ORGANIZED HEALTH CARE EDUCATION/TRAINING PROGRAM

## 2023-05-05 PROCEDURE — 99284 EMERGENCY DEPT VISIT MOD MDM: CPT

## 2023-05-05 RX ORDER — KETOROLAC TROMETHAMINE 30 MG/ML
15 INJECTION, SOLUTION INTRAMUSCULAR; INTRAVENOUS
Status: COMPLETED | OUTPATIENT
Start: 2023-05-05 | End: 2023-05-05

## 2023-05-05 RX ADMIN — KETOROLAC TROMETHAMINE 15 MG: 30 INJECTION, SOLUTION INTRAMUSCULAR; INTRAVENOUS at 09:05

## 2023-05-05 NOTE — ED PROVIDER NOTES
Encounter Date: 5/5/2023       History     Chief Complaint   Patient presents with    Sore Throat    Otalgia     47 year old male with DM,HLD,HTN presented tot he ED complaining of sore throat and ear pain which started 3 days ago. Denied any fever, chills, nausea, vomiting, chest pain, sob at thsi time. No other complaints today. Denied any discharge. Stated it hurts when he swallow but no issues with swallowing any food.     The history is provided by the patient.   Review of patient's allergies indicates:  No Known Allergies  Past Medical History:   Diagnosis Date    DM (diabetes mellitus)     Hyperlipidemia     Hypertension      Past Surgical History:   Procedure Laterality Date    Bone Spur removed       Family History   Problem Relation Age of Onset    Arthritis Mother     Hyperlipidemia Father     Diabetes Paternal Aunt      Social History     Tobacco Use    Smoking status: Never    Smokeless tobacco: Never   Substance Use Topics    Alcohol use: Yes     Comment: on occassion    Drug use: Not Currently     Types: Marijuana     Review of Systems   Constitutional: Negative.    HENT:  Positive for ear pain and sore throat. Negative for drooling, ear discharge, facial swelling, hearing loss, mouth sores, nosebleeds, postnasal drip, rhinorrhea, sinus pressure, sinus pain, sneezing, tinnitus, trouble swallowing and voice change.    Eyes: Negative.    Respiratory: Negative.     Cardiovascular: Negative.    Gastrointestinal: Negative.    Endocrine: Negative.    Musculoskeletal: Negative.    Neurological: Negative.    Hematological: Negative.  Negative for adenopathy.   Psychiatric/Behavioral: Negative.       Physical Exam     Initial Vitals [05/05/23 0824]   BP Pulse Resp Temp SpO2   (!) 145/89 72 16 97.9 °F (36.6 °C) 97 %      MAP       --         Physical Exam    Nursing note and vitals reviewed.  Constitutional: He appears well-developed and well-nourished. He is not diaphoretic. No distress.   HENT:   Head:  Normocephalic and atraumatic.   Right Ear: External ear normal.   Mouth/Throat: Oropharynx is clear and moist. No oropharyngeal exudate.   Eyes: Conjunctivae are normal.   Neck: No thyromegaly present.   Cardiovascular:  Normal rate and regular rhythm.     Exam reveals no friction rub.       No murmur heard.  Pulmonary/Chest: Breath sounds normal. No respiratory distress. He has no wheezes. He exhibits no tenderness.   Abdominal: Abdomen is soft. Bowel sounds are normal.   Musculoskeletal:         General: Normal range of motion.     Lymphadenopathy:     He has no cervical adenopathy.   Neurological: He is alert and oriented to person, place, and time.   Skin: Skin is warm and dry.   Psychiatric: He has a normal mood and affect.       ED Course   Procedures  Labs Reviewed   STREP GROUP A BY PCR - Normal    Narrative:     The Xpert Xpress Strep A test is a rapid, qualitative in vitro diagnostic test for the detection of Streptococcus pyogenes (Group A ß-hemolytic Streptococcus, Strep A) in throat swab specimens from patients with signs and symptoms of pharyngitis.            Imaging Results    None          Medications   ketorolac injection 15 mg (15 mg Intramuscular Given 5/5/23 0903)                              Clinical Impression:   Final diagnoses:  [J02.9] Viral pharyngitis (Primary)        ED Disposition Condition    Discharge Stable          ED Prescriptions    None       Follow-up Information       Follow up With Specialties Details Why Contact Info    Libia Metcalf NP Nurse Practitioner In 1 week  [address] [facility] [address] [phone]      [facility] Dept Emergency Medicine  If symptoms worsen [address] [facility] [address] [phone]             Wilmer Myers DO  Resident  05/05/23 1044

## 2023-05-05 NOTE — Clinical Note
"Neil Barton (Anthony)oit was seen and treated in our emergency department on 5/5/2023.  He may return to work on 05/06/2023.       If you have any questions or concerns, please don't hesitate to call.      Karma OLIVAS    "

## 2023-06-19 ENCOUNTER — PATIENT MESSAGE (OUTPATIENT)
Dept: ADMINISTRATIVE | Facility: HOSPITAL | Age: 48
End: 2023-06-19
Payer: MEDICAID

## 2023-07-25 ENCOUNTER — PATIENT MESSAGE (OUTPATIENT)
Dept: ADMINISTRATIVE | Facility: HOSPITAL | Age: 48
End: 2023-07-25
Payer: MEDICAID

## 2025-08-12 ENCOUNTER — HOSPITAL ENCOUNTER (EMERGENCY)
Facility: HOSPITAL | Age: 50
Discharge: HOME | End: 2025-08-12
Attending: FAMILY MEDICINE
Payer: COMMERCIAL

## 2025-08-12 VITALS
HEIGHT: 69 IN | DIASTOLIC BLOOD PRESSURE: 54 MMHG | WEIGHT: 270.5 LBS | RESPIRATION RATE: 20 BRPM | TEMPERATURE: 98 F | OXYGEN SATURATION: 98 % | SYSTOLIC BLOOD PRESSURE: 140 MMHG | BODY MASS INDEX: 40.07 KG/M2 | HEART RATE: 73 BPM

## 2025-08-12 DIAGNOSIS — J03.90 TONSILLITIS, PHLEGMONOUS: Primary | ICD-10-CM

## 2025-08-12 DIAGNOSIS — J02.9 PHARYNGITIS, UNSPECIFIED ETIOLOGY: ICD-10-CM

## 2025-08-12 DIAGNOSIS — J36 PERITONSILLAR ABSCESS: ICD-10-CM

## 2025-08-12 LAB
ALBUMIN SERPL-MCNC: 3.5 G/DL (ref 3.5–5)
ALBUMIN/GLOB SERPL: 0.7 RATIO (ref 1.1–2)
ALP SERPL-CCNC: 114 UNIT/L (ref 40–150)
ALT SERPL-CCNC: 34 UNIT/L (ref 0–55)
ANION GAP SERPL CALC-SCNC: 9 MEQ/L
AST SERPL-CCNC: 35 UNIT/L (ref 11–45)
BASOPHILS # BLD AUTO: 0.02 X10(3)/MCL
BASOPHILS NFR BLD AUTO: 0.4 %
BILIRUB SERPL-MCNC: 0.5 MG/DL
BUN SERPL-MCNC: 9.7 MG/DL (ref 8.4–25.7)
CALCIUM SERPL-MCNC: 9 MG/DL (ref 8.4–10.2)
CHLORIDE SERPL-SCNC: 101 MMOL/L (ref 98–107)
CO2 SERPL-SCNC: 25 MMOL/L (ref 22–29)
CREAT SERPL-MCNC: 0.95 MG/DL (ref 0.72–1.25)
CREAT/UREA NIT SERPL: 10
EOSINOPHIL # BLD AUTO: 0.11 X10(3)/MCL (ref 0–0.9)
EOSINOPHIL NFR BLD AUTO: 1.9 %
ERYTHROCYTE [DISTWIDTH] IN BLOOD BY AUTOMATED COUNT: 12 % (ref 11.5–17)
FLUAV AG UPPER RESP QL IA.RAPID: NOT DETECTED
FLUBV AG UPPER RESP QL IA.RAPID: NOT DETECTED
GFR SERPLBLD CREATININE-BSD FMLA CKD-EPI: >60 ML/MIN/1.73/M2
GLOBULIN SER-MCNC: 4.9 GM/DL (ref 2.4–3.5)
GLUCOSE SERPL-MCNC: 280 MG/DL (ref 74–100)
HCT VFR BLD AUTO: 45.2 % (ref 42–52)
HGB BLD-MCNC: 14.3 G/DL (ref 14–18)
IMM GRANULOCYTES # BLD AUTO: 0.01 X10(3)/MCL (ref 0–0.04)
IMM GRANULOCYTES NFR BLD AUTO: 0.2 %
LYMPHOCYTES # BLD AUTO: 2.84 X10(3)/MCL (ref 0.6–4.6)
LYMPHOCYTES NFR BLD AUTO: 50 %
MCH RBC QN AUTO: 26.9 PG (ref 27–31)
MCHC RBC AUTO-ENTMCNC: 31.6 G/DL (ref 33–36)
MCV RBC AUTO: 85.1 FL (ref 80–94)
MONOCYTES # BLD AUTO: 0.47 X10(3)/MCL (ref 0.1–1.3)
MONOCYTES NFR BLD AUTO: 8.3 %
NEUTROPHILS # BLD AUTO: 2.23 X10(3)/MCL (ref 2.1–9.2)
NEUTROPHILS NFR BLD AUTO: 39.2 %
NRBC BLD AUTO-RTO: 0 %
PLATELET # BLD AUTO: 179 X10(3)/MCL (ref 130–400)
PMV BLD AUTO: 11.8 FL (ref 7.4–10.4)
POTASSIUM SERPL-SCNC: 4.3 MMOL/L (ref 3.5–5.1)
PROT SERPL-MCNC: 8.4 GM/DL (ref 6.4–8.3)
RBC # BLD AUTO: 5.31 X10(6)/MCL (ref 4.7–6.1)
SARS-COV-2 RNA RESP QL NAA+PROBE: NOT DETECTED
SODIUM SERPL-SCNC: 135 MMOL/L (ref 136–145)
STREP A PCR (OHS): NOT DETECTED
WBC # BLD AUTO: 5.68 X10(3)/MCL (ref 4.5–11.5)

## 2025-08-12 PROCEDURE — 96376 TX/PRO/DX INJ SAME DRUG ADON: CPT

## 2025-08-12 PROCEDURE — 87636 SARSCOV2 & INF A&B AMP PRB: CPT | Performed by: FAMILY MEDICINE

## 2025-08-12 PROCEDURE — 63600175 PHARM REV CODE 636 W HCPCS: Mod: JZ,TB | Performed by: FAMILY MEDICINE

## 2025-08-12 PROCEDURE — 80053 COMPREHEN METABOLIC PANEL: CPT | Performed by: FAMILY MEDICINE

## 2025-08-12 PROCEDURE — 99285 EMERGENCY DEPT VISIT HI MDM: CPT | Mod: 25

## 2025-08-12 PROCEDURE — 87651 STREP A DNA AMP PROBE: CPT | Performed by: FAMILY MEDICINE

## 2025-08-12 PROCEDURE — 85025 COMPLETE CBC W/AUTO DIFF WBC: CPT | Performed by: FAMILY MEDICINE

## 2025-08-12 PROCEDURE — 25000003 PHARM REV CODE 250: Performed by: FAMILY MEDICINE

## 2025-08-12 PROCEDURE — 25500020 PHARM REV CODE 255: Performed by: FAMILY MEDICINE

## 2025-08-12 PROCEDURE — 96365 THER/PROPH/DIAG IV INF INIT: CPT | Mod: XU

## 2025-08-12 PROCEDURE — 96375 TX/PRO/DX INJ NEW DRUG ADDON: CPT | Mod: XU

## 2025-08-12 RX ORDER — ONDANSETRON HYDROCHLORIDE 4 MG/5ML
2 SOLUTION ORAL
Status: DISCONTINUED | OUTPATIENT
Start: 2025-08-12 | End: 2025-08-12

## 2025-08-12 RX ORDER — METHYLPREDNISOLONE SOD SUCC 125 MG
125 VIAL (EA) INJECTION
Status: COMPLETED | OUTPATIENT
Start: 2025-08-12 | End: 2025-08-12

## 2025-08-12 RX ORDER — MORPHINE SULFATE 2 MG/ML
4 INJECTION, SOLUTION INTRAMUSCULAR; INTRAVENOUS
Refills: 0 | Status: COMPLETED | OUTPATIENT
Start: 2025-08-12 | End: 2025-08-12

## 2025-08-12 RX ORDER — KETOROLAC TROMETHAMINE 30 MG/ML
30 INJECTION, SOLUTION INTRAMUSCULAR; INTRAVENOUS
Status: COMPLETED | OUTPATIENT
Start: 2025-08-12 | End: 2025-08-12

## 2025-08-12 RX ORDER — ONDANSETRON HYDROCHLORIDE 2 MG/ML
4 INJECTION, SOLUTION INTRAVENOUS
Status: COMPLETED | OUTPATIENT
Start: 2025-08-12 | End: 2025-08-12

## 2025-08-12 RX ORDER — PREDNISONE 20 MG/1
60 TABLET ORAL DAILY
Qty: 15 TABLET | Refills: 0 | Status: SHIPPED | OUTPATIENT
Start: 2025-08-12 | End: 2025-08-17

## 2025-08-12 RX ORDER — AMOXICILLIN AND CLAVULANATE POTASSIUM 875; 125 MG/1; MG/1
1 TABLET, FILM COATED ORAL 2 TIMES DAILY
Qty: 10 TABLET | Refills: 0 | Status: SHIPPED | OUTPATIENT
Start: 2025-08-12

## 2025-08-12 RX ADMIN — METHYLPREDNISOLONE SODIUM SUCCINATE 125 MG: 125 INJECTION, POWDER, FOR SOLUTION INTRAMUSCULAR; INTRAVENOUS at 03:08

## 2025-08-12 RX ADMIN — AMPICILLIN SODIUM, SULBACTAM SODIUM 3 G: 2; 1 INJECTION, POWDER, FOR SOLUTION INTRAMUSCULAR; INTRAVENOUS at 04:08

## 2025-08-12 RX ADMIN — MORPHINE SULFATE 4 MG: 2 INJECTION, SOLUTION INTRAMUSCULAR; INTRAVENOUS at 03:08

## 2025-08-12 RX ADMIN — IOHEXOL 100 ML: 350 INJECTION, SOLUTION INTRAVENOUS at 05:08

## 2025-08-12 RX ADMIN — KETOROLAC TROMETHAMINE 30 MG: 30 INJECTION, SOLUTION INTRAMUSCULAR; INTRAVENOUS at 05:08

## 2025-08-12 RX ADMIN — MORPHINE SULFATE 4 MG: 2 INJECTION, SOLUTION INTRAMUSCULAR; INTRAVENOUS at 05:08

## 2025-08-12 RX ADMIN — ONDANSETRON 4 MG: 2 INJECTION INTRAMUSCULAR; INTRAVENOUS at 03:08
